# Patient Record
Sex: FEMALE | Race: BLACK OR AFRICAN AMERICAN | NOT HISPANIC OR LATINO | Employment: FULL TIME | URBAN - METROPOLITAN AREA
[De-identification: names, ages, dates, MRNs, and addresses within clinical notes are randomized per-mention and may not be internally consistent; named-entity substitution may affect disease eponyms.]

---

## 2023-05-20 LAB — TRICHOMONAS VAGINALIS: NEGATIVE

## 2023-05-21 LAB
CHLAMYDIA TRACH., AMPLIFIED: NEGATIVE
N. GONORRHEA, AMPLIFIED: NEGATIVE

## 2023-08-08 ENCOUNTER — APPOINTMENT (OUTPATIENT)
Dept: LAB | Facility: LAB | Age: 28
End: 2023-08-08
Payer: MEDICAID

## 2023-08-08 LAB
ABO GROUP (TYPE) IN BLOOD: NORMAL
ANTIBODY SCREEN: NORMAL
CLUE CELLS: PRESENT
ERYTHROCYTE DISTRIBUTION WIDTH (RATIO) BY AUTOMATED COUNT: 14.3 % (ref 11.5–14.5)
ERYTHROCYTE MEAN CORPUSCULAR HEMOGLOBIN CONCENTRATION (G/DL) BY AUTOMATED: 31 G/DL (ref 32–36)
ERYTHROCYTE MEAN CORPUSCULAR VOLUME (FL) BY AUTOMATED COUNT: 95 FL (ref 80–100)
ERYTHROCYTES (10*6/UL) IN BLOOD BY AUTOMATED COUNT: 4.09 X10E12/L (ref 4–5.2)
ESTIMATED AVERAGE GLUCOSE FOR HBA1C: 82 MG/DL
HEMATOCRIT (%) IN BLOOD BY AUTOMATED COUNT: 38.7 % (ref 36–46)
HEMOGLOBIN (G/DL) IN BLOOD: 12 G/DL (ref 12–16)
HEMOGLOBIN A1C/HEMOGLOBIN TOTAL IN BLOOD: 4.5 %
HEPATITIS B VIRUS SURFACE AG PRESENCE IN SERUM: NONREACTIVE
HEPATITIS C VIRUS AB PRESENCE IN SERUM: NONREACTIVE
HIV 1/ 2 AG/AB SCREEN: NONREACTIVE
LEUKOCYTES (10*3/UL) IN BLOOD BY AUTOMATED COUNT: 10.6 X10E9/L (ref 4.4–11.3)
NRBC (PER 100 WBCS) BY AUTOMATED COUNT: 0 /100 WBC (ref 0–0)
NUGENT SCORE: 6
PLATELETS (10*3/UL) IN BLOOD AUTOMATED COUNT: 324 X10E9/L (ref 150–450)
REFLEX ADDED, ANEMIA PANEL: ABNORMAL
RH FACTOR: NORMAL
RUBELLA VIRUS IGG AB: NORMAL
SYPHILIS TOTAL AB: NONREACTIVE
YEAST: ABNORMAL

## 2023-08-09 LAB
CHLAMYDIA TRACH., AMPLIFIED: NEGATIVE
N. GONORRHEA, AMPLIFIED: NEGATIVE
TRICHOMONAS VAGINALIS: NEGATIVE
URINE CULTURE: NORMAL

## 2023-08-10 LAB
HEMOGLOBIN A2: 2.4 %
HEMOGLOBIN A: 97.3 %
HEMOGLOBIN F: 0.3 %
HEMOGLOBIN IDENTIFICATION INTERPRETATION: NORMAL
PATH REVIEW-HGB IDENTIFICATION: NORMAL

## 2023-09-15 ENCOUNTER — LAB (OUTPATIENT)
Dept: LAB | Facility: LAB | Age: 28
End: 2023-09-15
Payer: MEDICAID

## 2023-09-15 LAB
ALANINE AMINOTRANSFERASE (SGPT) (U/L) IN SER/PLAS: 6 U/L (ref 7–45)
ASPARTATE AMINOTRANSFERASE (SGOT) (U/L) IN SER/PLAS: 9 U/L (ref 9–39)
CREATININE (MG/DL) IN SER/PLAS: 0.67 MG/DL (ref 0.5–1.05)
CREATININE (MG/DL) IN URINE: 280 MG/DL (ref 20–320)
ERYTHROCYTE DISTRIBUTION WIDTH (RATIO) BY AUTOMATED COUNT: 13.8 % (ref 11.5–14.5)
ERYTHROCYTE MEAN CORPUSCULAR HEMOGLOBIN CONCENTRATION (G/DL) BY AUTOMATED: 32.4 G/DL (ref 32–36)
ERYTHROCYTE MEAN CORPUSCULAR VOLUME (FL) BY AUTOMATED COUNT: 95 FL (ref 80–100)
ERYTHROCYTES (10*6/UL) IN BLOOD BY AUTOMATED COUNT: 3.79 X10E12/L (ref 4–5.2)
GFR FEMALE: >90 ML/MIN/1.73M2
HEMATOCRIT (%) IN BLOOD BY AUTOMATED COUNT: 36.1 % (ref 36–46)
HEMOGLOBIN (G/DL) IN BLOOD: 11.7 G/DL (ref 12–16)
LACTATE DEHYDROGENASE (U/L) IN SER/PLAS BY LAC->PYR RXN: 109 U/L (ref 84–246)
LEUKOCYTES (10*3/UL) IN BLOOD BY AUTOMATED COUNT: 9.8 X10E9/L (ref 4.4–11.3)
NRBC (PER 100 WBCS) BY AUTOMATED COUNT: 0 /100 WBC (ref 0–0)
PLATELETS (10*3/UL) IN BLOOD AUTOMATED COUNT: 363 X10E9/L (ref 150–450)
PROTEIN (MG/DL) IN URINE: 22 MG/DL (ref 5–24)
PROTEIN/CREATININE (MG/MG) IN URINE: 0.08 MG/MG CREAT (ref 0–0.17)
URATE (MG/DL) IN SER/PLAS: 3.9 MG/DL (ref 2.3–6.7)
UREA NITROGEN (MG/DL) IN SER/PLAS: 6 MG/DL (ref 6–23)

## 2023-10-03 ENCOUNTER — HOSPITAL ENCOUNTER (OUTPATIENT)
Dept: RADIOLOGY | Facility: HOSPITAL | Age: 28
Discharge: HOME | End: 2023-10-03
Payer: MEDICAID

## 2023-10-03 DIAGNOSIS — Z36.82 NUCHAL TRANSLUCENCY OF FETUS ON PRENATAL ULTRASOUND (HHS-HCC): ICD-10-CM

## 2023-10-03 DIAGNOSIS — Z34.90 PREGNANCY (HHS-HCC): ICD-10-CM

## 2023-10-03 PROCEDURE — 76813 OB US NUCHAL MEAS 1 GEST: CPT | Performed by: OBSTETRICS & GYNECOLOGY

## 2023-10-03 PROCEDURE — 76813 OB US NUCHAL MEAS 1 GEST: CPT

## 2023-10-08 PROBLEM — F17.200 NICOTINE USE DISORDER: Status: ACTIVE | Noted: 2018-04-04

## 2023-10-08 PROBLEM — N73.0 PID (ACUTE PELVIC INFLAMMATORY DISEASE): Status: ACTIVE | Noted: 2020-07-19

## 2023-10-08 PROBLEM — R45.851 SUICIDAL IDEATION: Status: ACTIVE | Noted: 2022-12-08

## 2023-10-08 PROBLEM — F31.81 BIPOLAR 2 DISORDER, MAJOR DEPRESSIVE EPISODE (MULTI): Status: ACTIVE | Noted: 2022-10-25

## 2023-10-08 PROBLEM — S83.512A RUPTURE OF ANTERIOR CRUCIATE LIGAMENT OF LEFT KNEE: Status: ACTIVE | Noted: 2022-06-15

## 2023-10-08 PROBLEM — E66.9 OBESITY, CLASS II, BMI 35-39.9: Status: ACTIVE | Noted: 2020-11-27

## 2023-10-08 PROBLEM — D06.9 CIN III WITH SEVERE DYSPLASIA: Status: ACTIVE | Noted: 2023-10-08

## 2023-10-08 PROBLEM — R31.9 HEMATURIA: Status: ACTIVE | Noted: 2018-04-02

## 2023-10-08 PROBLEM — F12.90 MARIJUANA USE: Status: ACTIVE | Noted: 2021-01-26

## 2023-10-08 PROBLEM — O21.0 HYPEREMESIS GRAVIDARUM (HHS-HCC): Status: ACTIVE | Noted: 2021-01-28

## 2023-10-08 PROBLEM — J45.909 ASTHMA (HHS-HCC): Status: ACTIVE | Noted: 2022-06-15

## 2023-10-08 PROBLEM — M54.6 THORACIC SPINE PAIN: Status: ACTIVE | Noted: 2023-10-08

## 2023-10-08 PROBLEM — R87.613 HSIL (HIGH GRADE SQUAMOUS INTRAEPITHELIAL LESION) ON PAP SMEAR OF CERVIX: Status: ACTIVE | Noted: 2023-10-08

## 2023-10-08 PROBLEM — R87.610 ATYPICAL SQUAMOUS CELLS OF UNDETERMINED SIGNIFICANCE (ASCUS) ON PAPANICOLAOU SMEAR OF CERVIX: Status: ACTIVE | Noted: 2023-10-08

## 2023-10-08 RX ORDER — PYRIDOXINE HCL (VITAMIN B6) 25 MG
25 TABLET ORAL EVERY 8 HOURS PRN
COMMUNITY
Start: 2023-08-26

## 2023-10-08 RX ORDER — FLUOXETINE HYDROCHLORIDE 20 MG/1
20 CAPSULE ORAL
Status: ON HOLD | COMMUNITY
Start: 2022-12-09 | End: 2024-04-04 | Stop reason: ALTCHOICE

## 2023-10-08 RX ORDER — ALBUTEROL SULFATE 90 UG/1
1-2 AEROSOL, METERED RESPIRATORY (INHALATION) EVERY 4 HOURS PRN
COMMUNITY
Start: 2022-04-26

## 2023-10-08 RX ORDER — QUETIAPINE FUMARATE 25 MG/1
1-2 TABLET, FILM COATED ORAL NIGHTLY
Status: ON HOLD | COMMUNITY
Start: 2021-10-22 | End: 2024-04-04 | Stop reason: ALTCHOICE

## 2023-10-08 RX ORDER — IBUPROFEN 800 MG/1
800 TABLET ORAL EVERY 8 HOURS PRN
COMMUNITY
Start: 2022-03-02 | End: 2023-10-10 | Stop reason: ALTCHOICE

## 2023-10-08 RX ORDER — UBIDECARENONE 75 MG
1000 CAPSULE ORAL
Status: ON HOLD | COMMUNITY
Start: 2022-12-11 | End: 2024-04-04 | Stop reason: ALTCHOICE

## 2023-10-08 RX ORDER — FERROUS SULFATE 325(65) MG
1 TABLET ORAL 2 TIMES DAILY
Status: ON HOLD | COMMUNITY
Start: 2021-03-03 | End: 2024-04-04 | Stop reason: SDDI

## 2023-10-08 RX ORDER — PRENATAL VIT NO.126/IRON/FOLIC 28MG-0.8MG
1 TABLET ORAL DAILY
COMMUNITY
Start: 2023-09-15

## 2023-10-08 RX ORDER — DIPHENHYDRAMINE HYDROCHLORIDE 25 MG/1
CAPSULE ORAL
COMMUNITY
Start: 2022-10-26 | End: 2023-10-10 | Stop reason: ALTCHOICE

## 2023-10-08 RX ORDER — ACETAMINOPHEN 325 MG/1
3 TABLET ORAL EVERY 6 HOURS PRN
COMMUNITY
Start: 2021-09-10 | End: 2023-10-10 | Stop reason: ALTCHOICE

## 2023-10-08 RX ORDER — DOCUSATE SODIUM 100 MG/1
1 CAPSULE, LIQUID FILLED ORAL 2 TIMES DAILY PRN
Status: ON HOLD | COMMUNITY
Start: 2021-09-10 | End: 2024-04-04 | Stop reason: ALTCHOICE

## 2023-10-08 RX ORDER — HALOPERIDOL 5 MG/1
TABLET ORAL
COMMUNITY
Start: 2022-10-26 | End: 2023-10-10 | Stop reason: ALTCHOICE

## 2023-10-08 RX ORDER — ASPIRIN 81 MG/1
TABLET ORAL
COMMUNITY
Start: 2023-09-15 | End: 2024-04-07 | Stop reason: HOSPADM

## 2023-10-08 RX ORDER — GUANFACINE 1 MG/1
TABLET, EXTENDED RELEASE ORAL
COMMUNITY
Start: 2022-10-26 | End: 2023-10-10 | Stop reason: ALTCHOICE

## 2023-10-10 ENCOUNTER — ROUTINE PRENATAL (OUTPATIENT)
Dept: OBSTETRICS AND GYNECOLOGY | Facility: HOSPITAL | Age: 28
End: 2023-10-10
Payer: MEDICAID

## 2023-10-10 VITALS — WEIGHT: 217 LBS | SYSTOLIC BLOOD PRESSURE: 124 MMHG | BODY MASS INDEX: 37.25 KG/M2 | DIASTOLIC BLOOD PRESSURE: 83 MMHG

## 2023-10-10 DIAGNOSIS — Z3A.14 14 WEEKS GESTATION OF PREGNANCY (HHS-HCC): Primary | ICD-10-CM

## 2023-10-10 DIAGNOSIS — M54.30 SCIATIC NERVE PAIN, UNSPECIFIED LATERALITY: ICD-10-CM

## 2023-10-10 PROCEDURE — 99213 OFFICE O/P EST LOW 20 MIN: CPT | Mod: 25

## 2023-10-10 PROCEDURE — 99213 OFFICE O/P EST LOW 20 MIN: CPT

## 2023-10-10 PROCEDURE — 90686 IIV4 VACC NO PRSV 0.5 ML IM: CPT

## 2023-10-10 ASSESSMENT — ENCOUNTER SYMPTOMS
RESPIRATORY NEGATIVE: 0
CARDIOVASCULAR NEGATIVE: 0
ALLERGIC/IMMUNOLOGIC NEGATIVE: 0
ENDOCRINE NEGATIVE: 0
EYES NEGATIVE: 0
NEUROLOGICAL NEGATIVE: 0
CONSTITUTIONAL NEGATIVE: 0
HEMATOLOGIC/LYMPHATIC NEGATIVE: 0
PSYCHIATRIC NEGATIVE: 0
GASTROINTESTINAL NEGATIVE: 0
MUSCULOSKELETAL NEGATIVE: 0

## 2023-10-10 NOTE — PROGRESS NOTES
Assessment/Plan   Problem List Items Addressed This Visit             ICD-10-CM    Sciatic nerve pain M54.30     Referral to chiropractics placed; patient to schedule         Relevant Orders    Referral to [facility]     Other Visit Diagnoses         Codes    14 weeks gestation of pregnancy    -  Primary Z3A.14    Relevant Medications    doxylamine (Unisom, doxylamine,) 25 mg tablet          Anatomy US scheduled  Reviewed NIPT results WNL  Reviewed warning signs and when to call provider  Follow up in 4 weeks for a routine prenatal visit.    Subjective   Serena Baxter is a 28 y.o.  at 14w0d with a working estimated date of delivery of 2024, by Ultrasound who presents for a routine prenatal visit. She denies vaginal bleeding, abdominal pain, leakage of fluid.     Her pregnancy is complicated by:  Serena Baxter Problems (from 09/15/23 to present)       Problem Noted Resolved    Hyperemesis gravidarum 2021 by Jesusita Hampton No    Priority:  Medium               Objective   Physical Exam  Weight: 98.4 kg (217 lb), Pregravid BMI: Could not be calculated  TWG: Not found.   Expected Total Weight Gain: Could not be calculated   BP: 124/83  Fetal Heart Rate: 150      Postpartum Depression: Not on file        Prenatal Labs  Lab Results   Component Value Date    HGB 11.7 (L) 09/15/2023    HCT 36.1 09/15/2023    HEPBSAG NONREACTIVE 2023     Janet Stockton, SAURABH-YECENIA

## 2023-11-17 ENCOUNTER — HOSPITAL ENCOUNTER (OUTPATIENT)
Dept: RADIOLOGY | Facility: HOSPITAL | Age: 28
Discharge: HOME | End: 2023-11-17
Payer: MEDICAID

## 2023-11-17 ENCOUNTER — ROUTINE PRENATAL (OUTPATIENT)
Dept: OBSTETRICS AND GYNECOLOGY | Facility: HOSPITAL | Age: 28
End: 2023-11-17
Payer: MEDICAID

## 2023-11-17 VITALS — WEIGHT: 216 LBS | BODY MASS INDEX: 37.08 KG/M2 | DIASTOLIC BLOOD PRESSURE: 68 MMHG | SYSTOLIC BLOOD PRESSURE: 102 MMHG

## 2023-11-17 DIAGNOSIS — Z3A.19 19 WEEKS GESTATION OF PREGNANCY (HHS-HCC): Primary | ICD-10-CM

## 2023-11-17 DIAGNOSIS — Z34.90 PREGNANCY (HHS-HCC): ICD-10-CM

## 2023-11-17 DIAGNOSIS — O21.0 HYPEREMESIS GRAVIDARUM (HHS-HCC): ICD-10-CM

## 2023-11-17 PROCEDURE — 76811 OB US DETAILED SNGL FETUS: CPT

## 2023-11-17 PROCEDURE — 99213 OFFICE O/P EST LOW 20 MIN: CPT | Mod: TH | Performed by: NURSE PRACTITIONER

## 2023-11-17 PROCEDURE — 99213 OFFICE O/P EST LOW 20 MIN: CPT | Performed by: NURSE PRACTITIONER

## 2023-11-17 PROCEDURE — 76811 OB US DETAILED SNGL FETUS: CPT | Performed by: STUDENT IN AN ORGANIZED HEALTH CARE EDUCATION/TRAINING PROGRAM

## 2023-11-17 RX ORDER — ONDANSETRON 4 MG/1
4 TABLET, ORALLY DISINTEGRATING ORAL EVERY 8 HOURS PRN
Status: ON HOLD | COMMUNITY
End: 2024-04-04 | Stop reason: ALTCHOICE

## 2023-11-17 NOTE — PROGRESS NOTES
Discussing birth control options with patient encouraged her to start thinking about plans at this point in time.    Denies any current problems with pregnancy.    Was to have ultrasound this morning but was delayed due to her daughters school closing due to power outage.  We will see if patient can have ultrasound some time today.    Return in 4 weeks.

## 2024-01-30 ENCOUNTER — HOSPITAL ENCOUNTER (OUTPATIENT)
Dept: RADIOLOGY | Facility: HOSPITAL | Age: 29
Discharge: HOME | End: 2024-01-30
Payer: MEDICAID

## 2024-01-30 DIAGNOSIS — Z3A.30 30 WEEKS GESTATION OF PREGNANCY (HHS-HCC): ICD-10-CM

## 2024-01-30 DIAGNOSIS — Z3A.30 30 WEEKS GESTATION OF PREGNANCY (HHS-HCC): Primary | ICD-10-CM

## 2024-01-30 DIAGNOSIS — Z3A.19 19 WEEKS GESTATION OF PREGNANCY (HHS-HCC): ICD-10-CM

## 2024-01-30 PROCEDURE — 76819 FETAL BIOPHYS PROFIL W/O NST: CPT | Performed by: OBSTETRICS & GYNECOLOGY

## 2024-01-30 PROCEDURE — 76816 OB US FOLLOW-UP PER FETUS: CPT

## 2024-01-30 PROCEDURE — 76816 OB US FOLLOW-UP PER FETUS: CPT | Performed by: OBSTETRICS & GYNECOLOGY

## 2024-01-30 PROCEDURE — 76819 FETAL BIOPHYS PROFIL W/O NST: CPT

## 2024-02-13 ENCOUNTER — ROUTINE PRENATAL (OUTPATIENT)
Dept: OBSTETRICS AND GYNECOLOGY | Facility: HOSPITAL | Age: 29
End: 2024-02-13
Payer: MEDICAID

## 2024-02-13 ENCOUNTER — LAB (OUTPATIENT)
Dept: LAB | Facility: LAB | Age: 29
End: 2024-02-13
Payer: COMMERCIAL

## 2024-02-13 VITALS — SYSTOLIC BLOOD PRESSURE: 107 MMHG | DIASTOLIC BLOOD PRESSURE: 72 MMHG | BODY MASS INDEX: 37.59 KG/M2 | WEIGHT: 219 LBS

## 2024-02-13 DIAGNOSIS — Z23 NEED FOR TDAP VACCINATION: Primary | ICD-10-CM

## 2024-02-13 DIAGNOSIS — Z3A.32 32 WEEKS GESTATION OF PREGNANCY (HHS-HCC): ICD-10-CM

## 2024-02-13 LAB
ERYTHROCYTE [DISTWIDTH] IN BLOOD BY AUTOMATED COUNT: 13.8 % (ref 11.5–14.5)
GLUCOSE 1H P 50 G GLC PO SERPL-MCNC: 129 MG/DL
HCT VFR BLD AUTO: 31.2 % (ref 36–46)
HGB BLD-MCNC: 9.9 G/DL (ref 12–16)
MCH RBC QN AUTO: 29.1 PG (ref 26–34)
MCHC RBC AUTO-ENTMCNC: 31.7 G/DL (ref 32–36)
MCV RBC AUTO: 92 FL (ref 80–100)
NRBC BLD-RTO: 0 /100 WBCS (ref 0–0)
PLATELET # BLD AUTO: 272 X10*3/UL (ref 150–450)
RBC # BLD AUTO: 3.4 X10*6/UL (ref 4–5.2)
TREPONEMA PALLIDUM IGG+IGM AB [PRESENCE] IN SERUM OR PLASMA BY IMMUNOASSAY: NONREACTIVE
WBC # BLD AUTO: 10.3 X10*3/UL (ref 4.4–11.3)

## 2024-02-13 PROCEDURE — 82607 VITAMIN B-12: CPT

## 2024-02-13 PROCEDURE — 83550 IRON BINDING TEST: CPT

## 2024-02-13 PROCEDURE — 82728 ASSAY OF FERRITIN: CPT

## 2024-02-13 PROCEDURE — 99213 OFFICE O/P EST LOW 20 MIN: CPT | Mod: TH

## 2024-02-13 PROCEDURE — 86780 TREPONEMA PALLIDUM: CPT

## 2024-02-13 PROCEDURE — 82947 ASSAY GLUCOSE BLOOD QUANT: CPT

## 2024-02-13 PROCEDURE — 90471 IMMUNIZATION ADMIN: CPT

## 2024-02-13 PROCEDURE — 82746 ASSAY OF FOLIC ACID SERUM: CPT

## 2024-02-13 PROCEDURE — 99213 OFFICE O/P EST LOW 20 MIN: CPT

## 2024-02-13 PROCEDURE — 36415 COLL VENOUS BLD VENIPUNCTURE: CPT

## 2024-02-13 PROCEDURE — 85027 COMPLETE CBC AUTOMATED: CPT

## 2024-02-13 NOTE — PROGRESS NOTES
Assessment/Plan   Problem List Items Addressed This Visit    None  Visit Diagnoses         Codes    Need for Tdap vaccination    -  Primary Z23    Relevant Orders    Tdap vaccine, age 7 years and older  (BOOSTRIX) (Completed)    32 weeks gestation of pregnancy     Z3A.32    Relevant Orders    Syphilis Screen with Reflex    CBC Anemia Panel With Reflex,Pregnancy    Glucose, 1 Hour Screen, Pregnancy          24-28 week labs completed today  Tdap given  Has growth ultrasound scheduled for 3/15  No other concerns today  Reviewed s/sx of PTL, warning signs, fetal movement counts, and when to call provider  Follow up in 2 weeks for a routine prenatal visit.    Janet Stockton, SAURABH-YECENIA    Subjective     Serena Baxter is a 28 y.o.  at 32w0d with a working estimated date of delivery of 2024, by Ultrasound who presents for a routine prenatal visit. She denies vaginal bleeding, leakage of fluid, decreased fetal movements, or contractions.    Her pregnancy is complicated by:  Serena Baxter Problems (from 09/15/23 to present)       Problem Noted Resolved    Hyperemesis gravidarum 2021 by Jesusita Hampton No    Priority:  Medium               Objective   Physical Exam:   Weight: 99.3 kg (219 lb)  TWG: Not found.  Expected Total Weight Gain: Could not be calculated   Pregravid BMI: Could not be calculated  BP: 107/72  Fetal Heart Rate: 145 Fundal Height (cm): 32 cm Presentation: Vertex           Postpartum Depression: Not on file        Prenatal Labs  Lab Results   Component Value Date    HGB 11.7 (L) 09/15/2023    HCT 36.1 09/15/2023     09/15/2023    ABO A 2023    LABRH POS 2023    NEISSGONOAMP NEGATIVE 2023    CHLAMTRACAMP NEGATIVE 2023    SYPHT NONREACTIVE 2023    HEPBSAG NONREACTIVE 2023    HIV1X2 NONREACTIVE 2023    URINECULTURE MIXED URETHRAL KESHA. 2023

## 2024-02-14 ENCOUNTER — TELEPHONE (OUTPATIENT)
Dept: OBSTETRICS AND GYNECOLOGY | Facility: HOSPITAL | Age: 29
End: 2024-02-14
Payer: COMMERCIAL

## 2024-02-14 NOTE — TELEPHONE ENCOUNTER
Breast order complete signed and faxed to [facility].  PINKY Rivera-RN      ----- Message from EMILY Robin sent at 2/13/2024  4:59 PM EST -----  Regarding: breastpump  This patient is interested in a breast pump.  Thanks!    EMILY Robin

## 2024-02-16 LAB
FERRITIN SERPL-MCNC: 14 NG/ML
FOLATE SERPL-MCNC: 7 NG/ML
IRON SATN MFR SERPL: 11 %
IRON SERPL-MCNC: 52 UG/DL
REFLEX ADDED, ANEMIA PANEL: NORMAL
TIBC SERPL-MCNC: 458 UG/DL
UIBC SERPL-MCNC: 406 UG/DL
VIT B12 SERPL-MCNC: 249 PG/ML

## 2024-02-19 ENCOUNTER — TELEPHONE (OUTPATIENT)
Dept: OBSTETRICS AND GYNECOLOGY | Facility: HOSPITAL | Age: 29
End: 2024-02-19

## 2024-02-19 DIAGNOSIS — O99.013 ANEMIA DURING PREGNANCY IN THIRD TRIMESTER (HHS-HCC): Primary | ICD-10-CM

## 2024-02-19 RX ORDER — FAMOTIDINE 10 MG/ML
20 INJECTION INTRAVENOUS ONCE AS NEEDED
Status: CANCELLED | OUTPATIENT
Start: 2024-02-20

## 2024-02-19 RX ORDER — DIPHENHYDRAMINE HYDROCHLORIDE 50 MG/ML
50 INJECTION INTRAMUSCULAR; INTRAVENOUS AS NEEDED
Status: CANCELLED | OUTPATIENT
Start: 2024-02-20

## 2024-02-19 RX ORDER — EPINEPHRINE 0.3 MG/.3ML
0.3 INJECTION SUBCUTANEOUS EVERY 5 MIN PRN
Status: CANCELLED | OUTPATIENT
Start: 2024-02-20

## 2024-02-19 RX ORDER — ALBUTEROL SULFATE 0.83 MG/ML
3 SOLUTION RESPIRATORY (INHALATION) AS NEEDED
Status: CANCELLED | OUTPATIENT
Start: 2024-02-20

## 2024-02-28 ENCOUNTER — ROUTINE PRENATAL (OUTPATIENT)
Dept: OBSTETRICS AND GYNECOLOGY | Facility: HOSPITAL | Age: 29
End: 2024-02-28
Payer: MEDICAID

## 2024-02-28 VITALS
RESPIRATION RATE: 17 BRPM | HEIGHT: 64 IN | HEART RATE: 102 BPM | SYSTOLIC BLOOD PRESSURE: 127 MMHG | OXYGEN SATURATION: 100 % | TEMPERATURE: 97.3 F | DIASTOLIC BLOOD PRESSURE: 80 MMHG | BODY MASS INDEX: 37.22 KG/M2 | WEIGHT: 218 LBS

## 2024-02-28 DIAGNOSIS — R20.0 NUMBNESS IN BOTH LEGS: ICD-10-CM

## 2024-02-28 DIAGNOSIS — Z3A.34 34 WEEKS GESTATION OF PREGNANCY (HHS-HCC): Primary | ICD-10-CM

## 2024-02-28 PROCEDURE — 99213 OFFICE O/P EST LOW 20 MIN: CPT

## 2024-02-28 PROCEDURE — 99213 OFFICE O/P EST LOW 20 MIN: CPT | Mod: TH

## 2024-02-28 ASSESSMENT — PAIN SCALES - GENERAL: PAINLEVEL_OUTOF10: 0 - NO PAIN

## 2024-02-28 ASSESSMENT — PAIN - FUNCTIONAL ASSESSMENT: PAIN_FUNCTIONAL_ASSESSMENT: 0-10

## 2024-02-28 NOTE — PROGRESS NOTES
"Assessment/Plan   Problem List Items Addressed This Visit    None  Visit Diagnoses         Codes    34 weeks gestation of pregnancy    -  Primary Z3A.34    Relevant Orders    Follow Up In Obstetrics    Numbness in both legs     R20.0    Relevant Orders    Referral to Physical Therapy          Discussed routine GBS screening, to be completed next visit   surveillance weekly for BMI  Has concerns for bilateral leg numbness and hip pain, patient has support belt which does help, but then \"baby doesn't like it\" so she takes it off.  She feels the pain begin in her right sciatic area, then it spreads to her hip and goes down her leg and when she tries to reposition it \"hurts more\".  It happens occasionally on her right side as well.  Patient has tried stretching and it \"doesn't help\".  Offered referral to PT to assist; patient stated \"well, I'm almost due so I'm don't know how much it will help.\"  Counseled patient that a referral will be placed and if she decides to forgo now, she can use the referral for postpartum if desired; patient verbalized understanding and will think about it.  Growth ultrasound scheduled for 3/15.    No other concerns today  Reviewed s/sx of PTL, warning signs, fetal movement counts, and when to call provider  Follow up in 2 weeks for a routine prenatal visit.    EMILY Robin    Subjective     Serena Baxter is a 28 y.o.  at 34w1d with a working estimated date of delivery of 2024, by Ultrasound who presents for a routine prenatal visit. She denies vaginal bleeding, leakage of fluid, decreased fetal movements, or contractions.    Her pregnancy is complicated by:  Serena Baxter Problems (from 09/15/23 to present)       Problem Noted Resolved    Anemia during pregnancy in third trimester 2024 by EMILY Robin No    Priority:  Medium      Hyperemesis gravidarum 2021 by Jesusita Hampton No    Priority:  Medium           "     Objective   Physical Exam:   Weight: 98.9 kg (218 lb)  TWG: -2.268 kg (-5 lb)  Expected Total Weight Gain: 5 kg (11 lb)-9 kg (19 lb)   Pregravid BMI: 38.26  BP: 127/80  Fetal Heart Rate: 140 Fundal Height (cm): 34 cm Presentation: Vertex           Postpartum Depression: Not on file        Prenatal Labs  Lab Results   Component Value Date    HGB 9.9 (L) 02/13/2024    HCT 31.2 (L) 02/13/2024     02/13/2024    ABO A 08/08/2023    LABRH POS 08/08/2023    NEISSGONOAMP NEGATIVE 08/08/2023    CHLAMTRACAMP NEGATIVE 08/08/2023    SYPHT Nonreactive 02/13/2024    HEPBSAG NONREACTIVE 08/08/2023    HIV1X2 NONREACTIVE 08/08/2023    URINECULTURE MIXED URETHRAL KESHA. 08/08/2023     Lab Results   Component Value Date    GLUC1P 129 02/13/2024

## 2024-03-12 DIAGNOSIS — O99.013 ANEMIA DURING PREGNANCY IN THIRD TRIMESTER (HHS-HCC): Primary | ICD-10-CM

## 2024-03-12 RX ORDER — EPINEPHRINE 0.3 MG/.3ML
0.3 INJECTION SUBCUTANEOUS EVERY 5 MIN PRN
OUTPATIENT
Start: 2024-03-12

## 2024-03-12 RX ORDER — DIPHENHYDRAMINE HYDROCHLORIDE 50 MG/ML
50 INJECTION INTRAMUSCULAR; INTRAVENOUS AS NEEDED
OUTPATIENT
Start: 2024-03-12

## 2024-03-12 RX ORDER — ALBUTEROL SULFATE 0.83 MG/ML
3 SOLUTION RESPIRATORY (INHALATION) AS NEEDED
OUTPATIENT
Start: 2024-03-12

## 2024-03-12 RX ORDER — FAMOTIDINE 10 MG/ML
20 INJECTION INTRAVENOUS ONCE AS NEEDED
OUTPATIENT
Start: 2024-03-12

## 2024-03-15 ENCOUNTER — HOSPITAL ENCOUNTER (OUTPATIENT)
Dept: RADIOLOGY | Facility: HOSPITAL | Age: 29
Discharge: HOME | End: 2024-03-15
Payer: MEDICAID

## 2024-03-15 ENCOUNTER — ROUTINE PRENATAL (OUTPATIENT)
Dept: OBSTETRICS AND GYNECOLOGY | Facility: HOSPITAL | Age: 29
End: 2024-03-15
Payer: MEDICAID

## 2024-03-15 ENCOUNTER — INFUSION (OUTPATIENT)
Dept: INFUSION THERAPY | Facility: HOSPITAL | Age: 29
End: 2024-03-15
Payer: MEDICAID

## 2024-03-15 ENCOUNTER — APPOINTMENT (OUTPATIENT)
Dept: OBSTETRICS AND GYNECOLOGY | Facility: HOSPITAL | Age: 29
End: 2024-03-15
Payer: MEDICAID

## 2024-03-15 VITALS
HEART RATE: 73 BPM | RESPIRATION RATE: 16 BRPM | WEIGHT: 229 LBS | BODY MASS INDEX: 39.09 KG/M2 | TEMPERATURE: 98 F | DIASTOLIC BLOOD PRESSURE: 78 MMHG | OXYGEN SATURATION: 100 % | SYSTOLIC BLOOD PRESSURE: 111 MMHG | HEIGHT: 64 IN

## 2024-03-15 VITALS — BODY MASS INDEX: 39.31 KG/M2 | DIASTOLIC BLOOD PRESSURE: 68 MMHG | WEIGHT: 229 LBS | SYSTOLIC BLOOD PRESSURE: 117 MMHG

## 2024-03-15 DIAGNOSIS — Z34.80 SUPERVISION OF OTHER NORMAL PREGNANCY, ANTEPARTUM (HHS-HCC): Primary | ICD-10-CM

## 2024-03-15 DIAGNOSIS — O99.013 ANEMIA DURING PREGNANCY IN THIRD TRIMESTER (HHS-HCC): ICD-10-CM

## 2024-03-15 DIAGNOSIS — Z3A.34 34 WEEKS GESTATION OF PREGNANCY (HHS-HCC): ICD-10-CM

## 2024-03-15 DIAGNOSIS — Z3A.30 30 WEEKS GESTATION OF PREGNANCY (HHS-HCC): ICD-10-CM

## 2024-03-15 PROBLEM — Z34.90 ENCOUNTER FOR SUPERVISION OF NORMAL PREGNANCY, ANTEPARTUM (HHS-HCC): Status: ACTIVE | Noted: 2024-03-15

## 2024-03-15 PROCEDURE — 76816 OB US FOLLOW-UP PER FETUS: CPT | Performed by: OBSTETRICS & GYNECOLOGY

## 2024-03-15 PROCEDURE — 99213 OFFICE O/P EST LOW 20 MIN: CPT | Performed by: OBSTETRICS & GYNECOLOGY

## 2024-03-15 PROCEDURE — 76819 FETAL BIOPHYS PROFIL W/O NST: CPT | Performed by: OBSTETRICS & GYNECOLOGY

## 2024-03-15 PROCEDURE — 87081 CULTURE SCREEN ONLY: CPT | Performed by: OBSTETRICS & GYNECOLOGY

## 2024-03-15 PROCEDURE — 2500000004 HC RX 250 GENERAL PHARMACY W/ HCPCS (ALT 636 FOR OP/ED)

## 2024-03-15 PROCEDURE — 76816 OB US FOLLOW-UP PER FETUS: CPT

## 2024-03-15 PROCEDURE — 96366 THER/PROPH/DIAG IV INF ADDON: CPT

## 2024-03-15 PROCEDURE — 76819 FETAL BIOPHYS PROFIL W/O NST: CPT

## 2024-03-15 PROCEDURE — 96365 THER/PROPH/DIAG IV INF INIT: CPT

## 2024-03-15 PROCEDURE — 99213 OFFICE O/P EST LOW 20 MIN: CPT | Mod: TH | Performed by: OBSTETRICS & GYNECOLOGY

## 2024-03-15 RX ORDER — ALBUTEROL SULFATE 0.83 MG/ML
3 SOLUTION RESPIRATORY (INHALATION) AS NEEDED
OUTPATIENT
Start: 2024-03-15

## 2024-03-15 RX ORDER — FAMOTIDINE 10 MG/ML
20 INJECTION INTRAVENOUS ONCE AS NEEDED
OUTPATIENT
Start: 2024-03-15

## 2024-03-15 RX ORDER — EPINEPHRINE 0.3 MG/.3ML
0.3 INJECTION SUBCUTANEOUS EVERY 5 MIN PRN
OUTPATIENT
Start: 2024-03-15

## 2024-03-15 RX ORDER — DIPHENHYDRAMINE HYDROCHLORIDE 50 MG/ML
50 INJECTION INTRAMUSCULAR; INTRAVENOUS AS NEEDED
OUTPATIENT
Start: 2024-03-15

## 2024-03-15 RX ADMIN — SODIUM CHLORIDE 25 MG: 9 INJECTION, SOLUTION INTRAVENOUS at 10:45

## 2024-03-15 RX ADMIN — SODIUM CHLORIDE 975 MG: 9 INJECTION, SOLUTION INTRAVENOUS at 11:00

## 2024-03-15 ASSESSMENT — PAIN SCALES - GENERAL
PAINLEVEL: 8
PAINLEVEL: 8
PAINLEVEL: 0-NO PAIN
PAINLEVEL: 8
PAINLEVEL: 8
PAINLEVEL: 5
PAINLEVEL: 8

## 2024-03-15 ASSESSMENT — ENCOUNTER SYMPTOMS
DEPRESSION: 0
OCCASIONAL FEELINGS OF UNSTEADINESS: 0

## 2024-03-15 NOTE — PROGRESS NOTES
"[facility] 1200 Infusion Clinic Note   Date: March 15, 2024   Name: Serena Baxter  : 1995   MRN: [id number]         Reason for Visit: iv iron (Patient here for iron infusion)      Accompanied by:Self   Visit Type:: Infusion   Diagnosis: Anemia during pregnancy in third trimester    Allergies:   Allergies as of 03/15/2024 - Reviewed 03/15/2024   Allergen Reaction Noted    Trazodone Hives 10/07/2023    Tramadol Itching 10/07/2023    Latex Itching 10/07/2023      Current Meds has a current medication list which includes the following prescription(s): aspirin, classic prenatal, albuterol, cyanocobalamin, docusate sodium, doxylamine, ferrous sulfate (325 mg ferrous sulfate), fluoxetine, ondansetron odt, quetiapine, and vitamin b-6, and the following Facility-Administered Medications: iron dextran complex (Infed) 975 mg in sodium chloride 0.9% 500 mL IV.        Vitals:  Vitals:    03/15/24 0936   BP: 107/63   BP Location: Left arm   Patient Position: Sitting   BP Cuff Size: Large adult   Pulse: 85   Resp: 16   Temp: 36.8 °C (98.2 °F)   TempSrc: Temporal   SpO2: 100%   Weight: 104 kg (229 lb)   Height: 1.626 m (5' 4\")      Infusion Pre-procedure Checklist   Allergies reviewed: yes   Medications reviewed: yes   Contraindications to treatment:No   Previous reaction to current treatment:No   Current Health Issues: None   Pain: 5 [5]' Abdomen [1]   Contraindications based on patient history: No   Provider notified: Not applicable      Steadi Fall Risk  One or more falls in the last year? No  How many Times?    Was the patient injured in the fall?    Has trouble stepping onto curb?    Advised to use a cane or walker to get around safely?    Often has to rush to toilet?    Feels unsteady when walking?    Has lost some feeling in feet?    Often feels sad or depressed?    Steadies self on furniture while walking at home?    Takes medicine that makes them feel lightheaded or more tired than " usual?    Worried about Falling?    Takes medicine to sleep or improve mood?    Needs to push with hands when rising from a chair?        Negative for complaint: [x] all other systems have been reviewed and are negative for complaint   Infusion Readiness:   Assessment Concerns Related to Infusion: No  Provider notified: n/a  Assess patient for the concerns below. Document provider notification as appropriate:  - Does not meet criteria to treat N/A  - Has an active or recent infection with/without current antibiotic use N/A  - Has recent/planned dental work N/A  - Has recent/planned surgeries N/A  - Has recently received or plans to receive vaccinations N/A  - Has treatment related toxicities N/A  - Is pregnant (unless noted otherwise) YES      We administered iron dextran complex (Infed) 25 mg in sodium chloride 0.9% 50 mL IV and iron dextran complex (Infed) 975 mg in sodium chloride 0.9% 500 mL IV.     [facility] Dr Izabel Jacobson notified.    Patient remained in office for 30 additional minutes after infusion stopped to assess for reaction.   Patient remained stable the entire 30 minutes.   Patient tolerated infusion well, no reactions or complications.   Patient discharged home ambulatory.

## 2024-03-15 NOTE — PROGRESS NOTES
SUBJECTIVE  Serena Baxter is a 28 y.o.  at 36w3d with an estimated date of delivery of 2024, by Ultrasound who presents for a routine prenatal visit.    She denies loss of fluid, vaginal bleeding, regular contractions/cramping, and decreased fetal movement.     OBJECTIVE  Vitals:    03/15/24 1427   BP: 117/68   Weight: 104 kg (229 lb)          ASSESSMENT & PLAN    Anemia during pregnancy in third trimester  - S/p IV iron today    Obesity, Class II, BMI 35-39.9  - Growth US today    Encounter for supervision of normal pregnancy, antepartum  - GBS collected  - Reviewed IOL, considering in 39th week - scheduled at next visit    Follow up in 1 weeks for return OB visit.    Terrie Hair MD  Obstetrics & Gynecology  03/15/24

## 2024-03-18 LAB — GP B STREP GENITAL QL CULT: NORMAL

## 2024-03-19 ENCOUNTER — ROUTINE PRENATAL (OUTPATIENT)
Dept: OBSTETRICS AND GYNECOLOGY | Facility: HOSPITAL | Age: 29
End: 2024-03-19
Payer: COMMERCIAL

## 2024-03-19 VITALS — BODY MASS INDEX: 38.96 KG/M2 | WEIGHT: 227 LBS | DIASTOLIC BLOOD PRESSURE: 82 MMHG | SYSTOLIC BLOOD PRESSURE: 117 MMHG

## 2024-03-19 DIAGNOSIS — T78.40XA ALLERGY, INITIAL ENCOUNTER: ICD-10-CM

## 2024-03-19 DIAGNOSIS — Z3A.37 37 WEEKS GESTATION OF PREGNANCY (HHS-HCC): Primary | ICD-10-CM

## 2024-03-19 PROCEDURE — 99213 OFFICE O/P EST LOW 20 MIN: CPT | Mod: TH

## 2024-03-19 PROCEDURE — 99213 OFFICE O/P EST LOW 20 MIN: CPT

## 2024-03-19 RX ORDER — DIPHENHYDRAMINE HCL 25 MG
25 CAPSULE ORAL NIGHTLY PRN
Qty: 30 CAPSULE | Refills: 0 | Status: SHIPPED | OUTPATIENT
Start: 2024-03-19 | End: 2024-04-07 | Stop reason: HOSPADM

## 2024-03-19 RX ORDER — LORATADINE 10 MG
10 TABLET,DISINTEGRATING ORAL DAILY
Qty: 30 TABLET | Refills: 0 | Status: SHIPPED | OUTPATIENT
Start: 2024-03-19 | End: 2024-04-18

## 2024-03-19 NOTE — PROGRESS NOTES
Assessment/Plan   Problem List Items Addressed This Visit    None  Visit Diagnoses         Codes    37 weeks gestation of pregnancy    -  Primary Z3A.37    Relevant Orders    Labor Induction    Follow Up In Obstetrics    Follow Up In Obstetrics    Allergy, initial encounter     T78.40XA    Relevant Medications    diphenhydrAMINE (BENADryl) 25 mg capsule    loratadine (Claritin Reditabs) 10 mg disintegrating tablet           surveillance weekly for BMI  Discussed expectations and methods used for IOL  IOL scheduled 2024  No other concerns  Reviewed s/sx of labor, warning signs, fetal movement counts, and when to call provider  Follow up in 1 week for a routine prenatal visit.    EMILY Robin    Subjective     Serena Baxter is a 28 y.o.  at 37w0d with a working estimated date of delivery of 2024, by Ultrasound who presents for a routine prenatal visit. She denies vaginal bleeding, leakage of fluid, decreased fetal movements, or contractions.    Her pregnancy is complicated by:  Serena Baxter Problems (from 09/15/23 to present)       Problem Noted Resolved    Anemia during pregnancy in third trimester 2024 by EMILY Robin No    Priority:  Medium      Hyperemesis gravidarum 2021 by Jesusita Hampton No    Priority:  Medium      Encounter for supervision of normal pregnancy, antepartum 3/15/2024 by Terrie Hair MD No             Objective   Physical Exam:   Weight: 103 kg (227 lb)  TW.814 kg (4 lb)  Expected Total Weight Gain: 5 kg (11 lb)-9 kg (19 lb)   Pregravid BMI: 38.26  BP: 117/82  Fetal Heart Rate: 140 Fundal Height (cm): 38 cm Presentation: Vertex           Postpartum Depression: Not on file        Prenatal Labs  Lab Results   Component Value Date    HGB 9.9 (L) 2024    HCT 31.2 (L) 2024     2024    ABO A 2023    LABRH POS 2023    NEISSGONOAMP NEGATIVE 2023    CHLAMTRACAMP NEGATIVE  08/08/2023    SYPHT Nonreactive 02/13/2024    HEPBSAG NONREACTIVE 08/08/2023    HIV1X2 NONREACTIVE 08/08/2023    URINECULTURE MIXED URETHRAL KESHA. 08/08/2023     Lab Results   Component Value Date    GLUC1P 129 02/13/2024     Group B Strep Screen   Date Value Ref Range Status   03/15/2024 No Group B Streptococcus (GBS) isolated  Final

## 2024-04-04 ENCOUNTER — HOSPITAL ENCOUNTER (INPATIENT)
Facility: HOSPITAL | Age: 29
LOS: 3 days | Discharge: HOME | End: 2024-04-07
Attending: OBSTETRICS & GYNECOLOGY | Admitting: MIDWIFE
Payer: COMMERCIAL

## 2024-04-04 ENCOUNTER — APPOINTMENT (OUTPATIENT)
Dept: OBSTETRICS AND GYNECOLOGY | Facility: HOSPITAL | Age: 29
End: 2024-04-04
Payer: COMMERCIAL

## 2024-04-04 ENCOUNTER — ANESTHESIA (OUTPATIENT)
Dept: OBSTETRICS AND GYNECOLOGY | Facility: HOSPITAL | Age: 29
End: 2024-04-04
Payer: COMMERCIAL

## 2024-04-04 ENCOUNTER — ANESTHESIA EVENT (OUTPATIENT)
Dept: OBSTETRICS AND GYNECOLOGY | Facility: HOSPITAL | Age: 29
End: 2024-04-04
Payer: COMMERCIAL

## 2024-04-04 DIAGNOSIS — Z3A.37 37 WEEKS GESTATION OF PREGNANCY (HHS-HCC): ICD-10-CM

## 2024-04-04 DIAGNOSIS — Z30.017 NEXPLANON INSERTION: ICD-10-CM

## 2024-04-04 PROBLEM — Z85.89 HISTORY OF MUSCULOSKELETAL CANCER: Status: ACTIVE | Noted: 2024-04-04

## 2024-04-04 PROBLEM — F05 POSTOPERATIVE DELIRIUM: Status: ACTIVE | Noted: 2024-04-04

## 2024-04-04 PROBLEM — K92.2 GI BLEED: Status: ACTIVE | Noted: 2024-04-04

## 2024-04-04 PROBLEM — K44.9 ESOPHAGEAL HERNIA: Status: ACTIVE | Noted: 2024-04-04

## 2024-04-04 PROBLEM — Z34.90 ENCOUNTER FOR INDUCTION OF LABOR (HHS-HCC): Status: ACTIVE | Noted: 2024-04-04

## 2024-04-04 PROBLEM — G70.9 NEUROMUSCULAR DISEASE (MULTI): Status: ACTIVE | Noted: 2024-04-04

## 2024-04-04 PROBLEM — F41.9 ANXIETY: Status: ACTIVE | Noted: 2024-04-04

## 2024-04-04 LAB
ABO GROUP (TYPE) IN BLOOD: NORMAL
ANTIBODY SCREEN: NORMAL
ERYTHROCYTE [DISTWIDTH] IN BLOOD BY AUTOMATED COUNT: 15.6 % (ref 11.5–14.5)
HCT VFR BLD AUTO: 34.9 % (ref 36–46)
HGB BLD-MCNC: 11.3 G/DL (ref 12–16)
MCH RBC QN AUTO: 29.3 PG (ref 26–34)
MCHC RBC AUTO-ENTMCNC: 32.4 G/DL (ref 32–36)
MCV RBC AUTO: 90 FL (ref 80–100)
NRBC BLD-RTO: 0 /100 WBCS (ref 0–0)
PLATELET # BLD AUTO: 271 X10*3/UL (ref 150–450)
RBC # BLD AUTO: 3.86 X10*6/UL (ref 4–5.2)
RH FACTOR (ANTIGEN D): NORMAL
TREPONEMA PALLIDUM IGG+IGM AB [PRESENCE] IN SERUM OR PLASMA BY IMMUNOASSAY: NONREACTIVE
WBC # BLD AUTO: 9.2 X10*3/UL (ref 4.4–11.3)

## 2024-04-04 PROCEDURE — 3E033VJ INTRODUCTION OF OTHER HORMONE INTO PERIPHERAL VEIN, PERCUTANEOUS APPROACH: ICD-10-PCS

## 2024-04-04 PROCEDURE — 36415 COLL VENOUS BLD VENIPUNCTURE: CPT | Performed by: MIDWIFE

## 2024-04-04 PROCEDURE — 86901 BLOOD TYPING SEROLOGIC RH(D): CPT | Performed by: MIDWIFE

## 2024-04-04 PROCEDURE — 85027 COMPLETE CBC AUTOMATED: CPT | Performed by: MIDWIFE

## 2024-04-04 PROCEDURE — 86780 TREPONEMA PALLIDUM: CPT | Performed by: MIDWIFE

## 2024-04-04 PROCEDURE — 2500000004 HC RX 250 GENERAL PHARMACY W/ HCPCS (ALT 636 FOR OP/ED): Performed by: MIDWIFE

## 2024-04-04 PROCEDURE — 1120000001 HC OB PRIVATE ROOM DAILY

## 2024-04-04 RX ORDER — TRANEXAMIC ACID 100 MG/ML
1000 INJECTION, SOLUTION INTRAVENOUS ONCE AS NEEDED
Status: DISCONTINUED | OUTPATIENT
Start: 2024-04-04 | End: 2024-04-05 | Stop reason: HOSPADM

## 2024-04-04 RX ORDER — OXYTOCIN/0.9 % SODIUM CHLORIDE 30/500 ML
60 PLASTIC BAG, INJECTION (ML) INTRAVENOUS
Status: DISCONTINUED | OUTPATIENT
Start: 2024-04-04 | End: 2024-04-05 | Stop reason: HOSPADM

## 2024-04-04 RX ORDER — OXYTOCIN/0.9 % SODIUM CHLORIDE 30/500 ML
2-30 PLASTIC BAG, INJECTION (ML) INTRAVENOUS CONTINUOUS
Status: DISCONTINUED | OUTPATIENT
Start: 2024-04-04 | End: 2024-04-05

## 2024-04-04 RX ORDER — SODIUM CHLORIDE, SODIUM LACTATE, POTASSIUM CHLORIDE, CALCIUM CHLORIDE 600; 310; 30; 20 MG/100ML; MG/100ML; MG/100ML; MG/100ML
125 INJECTION, SOLUTION INTRAVENOUS CONTINUOUS
Status: DISCONTINUED | OUTPATIENT
Start: 2024-04-04 | End: 2024-04-05

## 2024-04-04 RX ORDER — OXYTOCIN/0.9 % SODIUM CHLORIDE 30/500 ML
2-30 PLASTIC BAG, INJECTION (ML) INTRAVENOUS CONTINUOUS
Status: DISCONTINUED | OUTPATIENT
Start: 2024-04-04 | End: 2024-04-04 | Stop reason: SDUPTHER

## 2024-04-04 RX ORDER — LOPERAMIDE HYDROCHLORIDE 2 MG/1
4 CAPSULE ORAL EVERY 2 HOUR PRN
Status: DISCONTINUED | OUTPATIENT
Start: 2024-04-04 | End: 2024-04-05 | Stop reason: HOSPADM

## 2024-04-04 RX ORDER — TERBUTALINE SULFATE 1 MG/ML
0.25 INJECTION SUBCUTANEOUS ONCE AS NEEDED
Status: DISCONTINUED | OUTPATIENT
Start: 2024-04-04 | End: 2024-04-05 | Stop reason: HOSPADM

## 2024-04-04 RX ORDER — MISOPROSTOL 200 UG/1
800 TABLET ORAL ONCE AS NEEDED
Status: DISCONTINUED | OUTPATIENT
Start: 2024-04-04 | End: 2024-04-05 | Stop reason: HOSPADM

## 2024-04-04 RX ORDER — CARBOPROST TROMETHAMINE 250 UG/ML
250 INJECTION, SOLUTION INTRAMUSCULAR ONCE AS NEEDED
Status: DISCONTINUED | OUTPATIENT
Start: 2024-04-04 | End: 2024-04-05 | Stop reason: HOSPADM

## 2024-04-04 RX ORDER — METHYLERGONOVINE MALEATE 0.2 MG/ML
0.2 INJECTION INTRAVENOUS ONCE AS NEEDED
Status: DISCONTINUED | OUTPATIENT
Start: 2024-04-04 | End: 2024-04-05 | Stop reason: HOSPADM

## 2024-04-04 RX ORDER — OXYTOCIN 10 [USP'U]/ML
10 INJECTION, SOLUTION INTRAMUSCULAR; INTRAVENOUS ONCE AS NEEDED
Status: DISCONTINUED | OUTPATIENT
Start: 2024-04-04 | End: 2024-04-05 | Stop reason: HOSPADM

## 2024-04-04 RX ADMIN — Medication 2 MILLI-UNITS/MIN: at 20:22

## 2024-04-04 RX ADMIN — SODIUM CHLORIDE, POTASSIUM CHLORIDE, SODIUM LACTATE AND CALCIUM CHLORIDE 125 ML/HR: 600; 310; 30; 20 INJECTION, SOLUTION INTRAVENOUS at 20:17

## 2024-04-04 SDOH — HEALTH STABILITY: MENTAL HEALTH: HAVE YOU USED ANY PRESCRIPTION DRUGS OTHER THAN PRESCRIBED IN THE PAST 12 MONTHS?: NO

## 2024-04-04 SDOH — HEALTH STABILITY: MENTAL HEALTH: SUICIDAL BEHAVIOR (LIFETIME): NO

## 2024-04-04 SDOH — SOCIAL STABILITY: SOCIAL INSECURITY: ARE YOU OR HAVE YOU BEEN THREATENED OR ABUSED PHYSICALLY, EMOTIONALLY, OR SEXUALLY BY ANYONE?: NO

## 2024-04-04 SDOH — HEALTH STABILITY: MENTAL HEALTH: NON-SPECIFIC ACTIVE SUICIDAL THOUGHTS (PAST 1 MONTH): NO

## 2024-04-04 SDOH — HEALTH STABILITY: MENTAL HEALTH: WERE YOU ABLE TO COMPLETE ALL THE BEHAVIORAL HEALTH SCREENINGS?: YES

## 2024-04-04 SDOH — ECONOMIC STABILITY: HOUSING INSECURITY: DO YOU FEEL UNSAFE GOING BACK TO THE PLACE WHERE YOU ARE LIVING?: NO

## 2024-04-04 SDOH — SOCIAL STABILITY: SOCIAL INSECURITY: VERBAL ABUSE: DENIES

## 2024-04-04 SDOH — HEALTH STABILITY: MENTAL HEALTH: CURRENT SMOKER: 1

## 2024-04-04 SDOH — SOCIAL STABILITY: SOCIAL INSECURITY: PHYSICAL ABUSE: DENIES

## 2024-04-04 SDOH — SOCIAL STABILITY: SOCIAL INSECURITY: DOES ANYONE TRY TO KEEP YOU FROM HAVING/CONTACTING OTHER FRIENDS OR DOING THINGS OUTSIDE YOUR HOME?: NO

## 2024-04-04 SDOH — SOCIAL STABILITY: SOCIAL INSECURITY: ABUSE SCREEN: ADULT

## 2024-04-04 SDOH — HEALTH STABILITY: MENTAL HEALTH: HAVE YOU USED ANY SUBSTANCES (CANABIS, COCAINE, HEROIN, HALLUCINOGENS, INHALANTS, ETC.) IN THE PAST 12 MONTHS?: NO

## 2024-04-04 SDOH — SOCIAL STABILITY: SOCIAL INSECURITY: DO YOU FEEL ANYONE HAS EXPLOITED OR TAKEN ADVANTAGE OF YOU FINANCIALLY OR OF YOUR PERSONAL PROPERTY?: NO

## 2024-04-04 SDOH — SOCIAL STABILITY: SOCIAL INSECURITY: HAVE YOU HAD THOUGHTS OF HARMING ANYONE ELSE?: NO

## 2024-04-04 SDOH — HEALTH STABILITY: MENTAL HEALTH: WISH TO BE DEAD (PAST 1 MONTH): NO

## 2024-04-04 SDOH — SOCIAL STABILITY: SOCIAL INSECURITY: ARE THERE ANY APPARENT SIGNS OF INJURIES/BEHAVIORS THAT COULD BE RELATED TO ABUSE/NEGLECT?: NO

## 2024-04-04 SDOH — SOCIAL STABILITY: SOCIAL INSECURITY: HAS ANYONE EVER THREATENED TO HURT YOUR FAMILY OR YOUR PETS?: NO

## 2024-04-04 ASSESSMENT — PAIN SCALES - GENERAL
PAINLEVEL_OUTOF10: 5 - MODERATE PAIN
PAINLEVEL_OUTOF10: 0 - NO PAIN
PAINLEVEL_OUTOF10: 0 - NO PAIN
PAINLEVEL_OUTOF10: 5 - MODERATE PAIN

## 2024-04-04 ASSESSMENT — PATIENT HEALTH QUESTIONNAIRE - PHQ9
1. LITTLE INTEREST OR PLEASURE IN DOING THINGS: NOT AT ALL
2. FEELING DOWN, DEPRESSED OR HOPELESS: NOT AT ALL
SUM OF ALL RESPONSES TO PHQ9 QUESTIONS 1 & 2: 0

## 2024-04-04 ASSESSMENT — LIFESTYLE VARIABLES
SKIP TO QUESTIONS 9-10: 1
AUDIT-C TOTAL SCORE: 0
HOW OFTEN DO YOU HAVE A DRINK CONTAINING ALCOHOL: NEVER
HOW MANY STANDARD DRINKS CONTAINING ALCOHOL DO YOU HAVE ON A TYPICAL DAY: PATIENT DOES NOT DRINK
AUDIT-C TOTAL SCORE: 0
HOW OFTEN DO YOU HAVE 6 OR MORE DRINKS ON ONE OCCASION: NEVER

## 2024-04-04 ASSESSMENT — ACTIVITIES OF DAILY LIVING (ADL): LACK_OF_TRANSPORTATION: NO

## 2024-04-04 NOTE — H&P
Obstetrical Admission History and Physical     Serena Baxter is a 28 y.o.  at 39w2d. FRANCISCA: 2024, by Ultrasound. Estimated fetal weight: 7#8. She has had PNC with Kath.    Chief Complaint: IOL  Pregnancy notable for:  -GBS neg  -EFW 7#8    PMH: asthma  PSH: neg  GYN hx:  Hsil CINIII  OBHx: svb x4- one set of twins  Fam hx: noncontributory  Meds: asa pnv  Social hx: denies t/e/d      Assessment/Plan    28 y/p  at 39.2 weeks  Favorable cervix at term  Cat 1  Gbs neg      P: Admit to labor and delivery       OB admission labs      Monitor vital signs per unit protocol      Encourage frequent position changes      Regular diet until pitocin or epidural      Pain management per patient request      Continue assessment of maternal and fetal well-being      Recheck as clinically indicted by maternal or fetal status      Anticipate SVB       __Shlomo___ aware of admission    EMILY Edward   Principal Problem:    Encounter for induction of labor      Serena Baxter Problems (from 09/15/23 to present)       Problem Noted Resolved    Encounter for induction of labor 2024 by EMILY Wong No    Priority:  Medium      Encounter for supervision of normal pregnancy, antepartum 3/15/2024 by Terrie Hair MD No    Priority:  Medium      Anemia during pregnancy in third trimester 2024 by EMILY Robin No    Priority:  Medium      Hyperemesis gravidarum 2021 by Jesusita Hampton No    Priority:  Medium            Options for delivery have been discussed with the patient and she elects for an induction of labor.  Cervical ripening with cytotec, cervidil, other prostaglandin agents has been discussed.  Induction of labor with pitocin, amniotomy, cytotec, and cervical balloon have been discussed in detail. The risks, benefits, complications, alternatives, expected outcomes, potential problems during recuperation and recovery, and the risks of not performing the  procedure were discussed with the patient. The patient stated understanding that the risks of delivery include, but are not limited to: death; reaction to medications; injury to bowel, bladder, ureters, uterus, cervix, vagina, and other pelvic and abdominal structures, infection; blood loss and possible need for transfusion; and potential need for surgery, including hysterectomy. The risks of injury to the infant during delivery were also discussed. All questions were answered. There was concurrence with the planned procedure, and the patient wanted to proceed.    Admit to inpatient status. I anticipate that this patient will require a stay exceeding at least 2 midnights for delivery and postpartum.  Induction of labor.  Management of pregnancy complications, as indicated.    Subjective   Good fetal movement. Denies vaginal bleeding., Denies contractions., Denies leaking of fluid.       Reason for Induction of Labor:  Pregnancy at 39 weeks or greater for induction         Obstetrical History   OB History    Para Term  AB Living   5 4 3 1 0 5   SAB IAB Ectopic Multiple Live Births   0 0 0 1 5      # Outcome Date GA Lbr Kit/2nd Weight Sex Delivery Anes PTL Lv   5 Current            4 Term 21 39w4d  2.92 kg F Vag-Spont EPI N HILL      Complications: Positive GBS test   3 Term 16 37w0d  3.005 kg F Vag-Spont EPI  HILL   2A  14 36w0d  2.268 kg F Vag-Spont EPI Y HILL   2B  14 36w0d  2.268 kg F Vag-Spont EPI  HILL   1 Term 13 38w0d  2.892 kg F Vag-Spont EPI N HILL       Past Medical History  Past Medical History:   Diagnosis Date    Asthma     Encounter for immunization     Need for Tdap vaccination    Family history of sickle cell trait     Hx of pre-eclampsia in prior pregnancy, currently pregnant     Post partum depression         Past Surgical History   Past Surgical History:   Procedure Laterality Date    OTHER SURGICAL HISTORY  2022    Loop electrosurgical  excision procedure       Social History  Social History     Tobacco Use    Smoking status: Never    Smokeless tobacco: Never   Substance Use Topics    Alcohol use: Not Currently     Substance and Sexual Activity   Drug Use Never       Allergies  Trazodone, Tramadol, and Latex     Medications  Medications Prior to Admission   Medication Sig Dispense Refill Last Dose    albuterol 90 mcg/actuation inhaler Inhale 1-2 puffs every 4 hours if needed for wheezing.   More than a month    aspirin 81 mg EC tablet    Past Week    Classic Prenatal 28 mg iron- 800 mcg tablet tablet Take 1 tablet by mouth once daily.   4/4/2024    diphenhydrAMINE (BENADryl) 25 mg capsule Take 1 capsule (25 mg) by mouth as needed at bedtime for itching. 30 capsule 0 Past Month    doxylamine (Unisom, doxylamine,) 25 mg tablet Take 1 tablet (25 mg) by mouth as needed at bedtime for sleep. 30 tablet 0     loratadine (Claritin Reditabs) 10 mg disintegrating tablet Take 1 tablet (10 mg) by mouth once daily. 30 tablet 0 Past Month    Vitamin B-6 25 mg tablet Take 1 tablet (25 mg) by mouth every 8 hours if needed.   Past Month       Objective    Last Vitals  Temp Pulse Resp BP MAP O2 Sat   36.7 °C (98.1 °F) 102 18 116/71   98 %     Physical Examination  HEENT: PERRLA. External ears normal. Nose normal, no erythema or discharge. Mouth and throat clear.  ABDOMEN: soft, gravid, nontender, nondistended, no abnormal masses, no epigastric pain  FHR is  , with  , and a   tracing.    [place] reading:    The fetus is in a vertex presentation, determined by ultrasound  VAGINA: normal appearing vagina with normal color and discharge and no lesions noted  CERVIX: 3 cm dilated, 80 % effaced, -3 station; MEMBRANES are  Intact  EXTREMITIES: no redness or tenderness in the calves or thighs, no edema  SKIN: normal coloration and turgor, no rashes  PSYCHOLOGICAL: awake and alert; oriented to person, place, and time    Lab Review  Labs in chart were reviewed.

## 2024-04-05 PROCEDURE — 51701 INSERT BLADDER CATHETER: CPT

## 2024-04-05 PROCEDURE — 2500000005 HC RX 250 GENERAL PHARMACY W/O HCPCS

## 2024-04-05 PROCEDURE — 01967 NEURAXL LBR ANES VAG DLVR: CPT | Performed by: ANESTHESIOLOGY

## 2024-04-05 PROCEDURE — 7100000016 HC LABOR RECOVERY PER HOUR

## 2024-04-05 PROCEDURE — 7210000002 HC LABOR PER HOUR

## 2024-04-05 PROCEDURE — 1210000001 HC SEMI-PRIVATE ROOM DAILY

## 2024-04-05 PROCEDURE — 2500000001 HC RX 250 WO HCPCS SELF ADMINISTERED DRUGS (ALT 637 FOR MEDICARE OP): Performed by: MIDWIFE

## 2024-04-05 PROCEDURE — 01967 NEURAXL LBR ANES VAG DLVR: CPT

## 2024-04-05 PROCEDURE — 10907ZC DRAINAGE OF AMNIOTIC FLUID, THERAPEUTIC FROM PRODUCTS OF CONCEPTION, VIA NATURAL OR ARTIFICIAL OPENING: ICD-10-PCS | Performed by: MIDWIFE

## 2024-04-05 PROCEDURE — 2500000004 HC RX 250 GENERAL PHARMACY W/ HCPCS (ALT 636 FOR OP/ED): Performed by: MIDWIFE

## 2024-04-05 PROCEDURE — 59409 OBSTETRICAL CARE: CPT | Performed by: MIDWIFE

## 2024-04-05 RX ORDER — ONDANSETRON HYDROCHLORIDE 2 MG/ML
8 INJECTION, SOLUTION INTRAVENOUS ONCE
Status: COMPLETED | OUTPATIENT
Start: 2024-04-05 | End: 2024-04-05

## 2024-04-05 RX ORDER — ONDANSETRON HYDROCHLORIDE 2 MG/ML
4 INJECTION, SOLUTION INTRAVENOUS EVERY 6 HOURS PRN
Status: DISCONTINUED | OUTPATIENT
Start: 2024-04-05 | End: 2024-04-07 | Stop reason: HOSPADM

## 2024-04-05 RX ORDER — ADHESIVE BANDAGE
10 BANDAGE TOPICAL
Status: DISCONTINUED | OUTPATIENT
Start: 2024-04-05 | End: 2024-04-07 | Stop reason: HOSPADM

## 2024-04-05 RX ORDER — ENOXAPARIN SODIUM 100 MG/ML
40 INJECTION SUBCUTANEOUS EVERY 24 HOURS
Status: DISCONTINUED | OUTPATIENT
Start: 2024-04-06 | End: 2024-04-07 | Stop reason: HOSPADM

## 2024-04-05 RX ORDER — DIPHENHYDRAMINE HCL 25 MG
25 CAPSULE ORAL EVERY 6 HOURS PRN
Status: DISCONTINUED | OUTPATIENT
Start: 2024-04-05 | End: 2024-04-07 | Stop reason: HOSPADM

## 2024-04-05 RX ORDER — ONDANSETRON 4 MG/1
4 TABLET, FILM COATED ORAL EVERY 6 HOURS PRN
Status: DISCONTINUED | OUTPATIENT
Start: 2024-04-05 | End: 2024-04-05 | Stop reason: SDUPTHER

## 2024-04-05 RX ORDER — OXYTOCIN 10 [USP'U]/ML
10 INJECTION, SOLUTION INTRAMUSCULAR; INTRAVENOUS ONCE AS NEEDED
Status: DISCONTINUED | OUTPATIENT
Start: 2024-04-05 | End: 2024-04-07 | Stop reason: HOSPADM

## 2024-04-05 RX ORDER — LOPERAMIDE HYDROCHLORIDE 2 MG/1
4 CAPSULE ORAL EVERY 2 HOUR PRN
Status: DISCONTINUED | OUTPATIENT
Start: 2024-04-05 | End: 2024-04-07 | Stop reason: HOSPADM

## 2024-04-05 RX ORDER — METOCLOPRAMIDE 10 MG/1
10 TABLET ORAL EVERY 6 HOURS PRN
Status: DISCONTINUED | OUTPATIENT
Start: 2024-04-05 | End: 2024-04-07 | Stop reason: HOSPADM

## 2024-04-05 RX ORDER — ONDANSETRON 4 MG/1
4 TABLET, FILM COATED ORAL EVERY 6 HOURS PRN
Status: DISCONTINUED | OUTPATIENT
Start: 2024-04-05 | End: 2024-04-07 | Stop reason: HOSPADM

## 2024-04-05 RX ORDER — METHYLERGONOVINE MALEATE 0.2 MG/ML
0.2 INJECTION INTRAVENOUS ONCE AS NEEDED
Status: DISCONTINUED | OUTPATIENT
Start: 2024-04-05 | End: 2024-04-07 | Stop reason: HOSPADM

## 2024-04-05 RX ORDER — BISACODYL 10 MG/1
10 SUPPOSITORY RECTAL DAILY PRN
Status: DISCONTINUED | OUTPATIENT
Start: 2024-04-05 | End: 2024-04-07 | Stop reason: HOSPADM

## 2024-04-05 RX ORDER — FENTANYL/BUPIVACAINE/NS/PF 2MCG/ML-.1
PLASTIC BAG, INJECTION (ML) INJECTION CONTINUOUS PRN
Status: DISCONTINUED | OUTPATIENT
Start: 2024-04-05 | End: 2024-04-05

## 2024-04-05 RX ORDER — LIDOCAINE 560 MG/1
1 PATCH PERCUTANEOUS; TOPICAL; TRANSDERMAL
Status: DISCONTINUED | OUTPATIENT
Start: 2024-04-05 | End: 2024-04-07 | Stop reason: HOSPADM

## 2024-04-05 RX ORDER — FENTANYL/BUPIVACAINE/NS/PF 2MCG/ML-.1
PLASTIC BAG, INJECTION (ML) INJECTION AS NEEDED
Status: DISCONTINUED | OUTPATIENT
Start: 2024-04-05 | End: 2024-04-05

## 2024-04-05 RX ORDER — TRANEXAMIC ACID 100 MG/ML
1000 INJECTION, SOLUTION INTRAVENOUS ONCE AS NEEDED
Status: DISCONTINUED | OUTPATIENT
Start: 2024-04-05 | End: 2024-04-07 | Stop reason: HOSPADM

## 2024-04-05 RX ORDER — METOCLOPRAMIDE HYDROCHLORIDE 5 MG/ML
10 INJECTION INTRAMUSCULAR; INTRAVENOUS EVERY 6 HOURS PRN
Status: DISCONTINUED | OUTPATIENT
Start: 2024-04-05 | End: 2024-04-07 | Stop reason: HOSPADM

## 2024-04-05 RX ORDER — HYDRALAZINE HYDROCHLORIDE 20 MG/ML
5 INJECTION INTRAMUSCULAR; INTRAVENOUS ONCE AS NEEDED
Status: DISCONTINUED | OUTPATIENT
Start: 2024-04-05 | End: 2024-04-07 | Stop reason: HOSPADM

## 2024-04-05 RX ORDER — CARBOPROST TROMETHAMINE 250 UG/ML
250 INJECTION, SOLUTION INTRAMUSCULAR ONCE AS NEEDED
Status: DISCONTINUED | OUTPATIENT
Start: 2024-04-05 | End: 2024-04-07 | Stop reason: HOSPADM

## 2024-04-05 RX ORDER — IBUPROFEN 600 MG/1
600 TABLET ORAL EVERY 6 HOURS
Status: DISCONTINUED | OUTPATIENT
Start: 2024-04-05 | End: 2024-04-07 | Stop reason: HOSPADM

## 2024-04-05 RX ORDER — OXYTOCIN/0.9 % SODIUM CHLORIDE 30/500 ML
60 PLASTIC BAG, INJECTION (ML) INTRAVENOUS ONCE AS NEEDED
Status: DISCONTINUED | OUTPATIENT
Start: 2024-04-05 | End: 2024-04-07 | Stop reason: HOSPADM

## 2024-04-05 RX ORDER — NIFEDIPINE 10 MG/1
10 CAPSULE ORAL ONCE AS NEEDED
Status: DISCONTINUED | OUTPATIENT
Start: 2024-04-05 | End: 2024-04-07 | Stop reason: HOSPADM

## 2024-04-05 RX ORDER — DIPHENHYDRAMINE HYDROCHLORIDE 50 MG/ML
25 INJECTION INTRAMUSCULAR; INTRAVENOUS EVERY 6 HOURS PRN
Status: DISCONTINUED | OUTPATIENT
Start: 2024-04-05 | End: 2024-04-07 | Stop reason: HOSPADM

## 2024-04-05 RX ORDER — SIMETHICONE 80 MG
80 TABLET,CHEWABLE ORAL 4 TIMES DAILY PRN
Status: DISCONTINUED | OUTPATIENT
Start: 2024-04-05 | End: 2024-04-07 | Stop reason: HOSPADM

## 2024-04-05 RX ORDER — ONDANSETRON HYDROCHLORIDE 2 MG/ML
4 INJECTION, SOLUTION INTRAVENOUS EVERY 6 HOURS PRN
Status: DISCONTINUED | OUTPATIENT
Start: 2024-04-05 | End: 2024-04-05 | Stop reason: SDUPTHER

## 2024-04-05 RX ORDER — POLYETHYLENE GLYCOL 3350 17 G/17G
17 POWDER, FOR SOLUTION ORAL 2 TIMES DAILY PRN
Status: DISCONTINUED | OUTPATIENT
Start: 2024-04-05 | End: 2024-04-07 | Stop reason: HOSPADM

## 2024-04-05 RX ORDER — LABETALOL HYDROCHLORIDE 5 MG/ML
20 INJECTION, SOLUTION INTRAVENOUS ONCE AS NEEDED
Status: DISCONTINUED | OUTPATIENT
Start: 2024-04-05 | End: 2024-04-07 | Stop reason: HOSPADM

## 2024-04-05 RX ORDER — LIDOCAINE HYDROCHLORIDE 10 MG/ML
30 INJECTION INFILTRATION; PERINEURAL ONCE AS NEEDED
Status: DISCONTINUED | OUTPATIENT
Start: 2024-04-05 | End: 2024-04-07 | Stop reason: HOSPADM

## 2024-04-05 RX ORDER — ACETAMINOPHEN 325 MG/1
975 TABLET ORAL EVERY 6 HOURS
Status: DISCONTINUED | OUTPATIENT
Start: 2024-04-05 | End: 2024-04-07 | Stop reason: HOSPADM

## 2024-04-05 RX ORDER — MISOPROSTOL 200 UG/1
800 TABLET ORAL ONCE AS NEEDED
Status: DISCONTINUED | OUTPATIENT
Start: 2024-04-05 | End: 2024-04-07 | Stop reason: HOSPADM

## 2024-04-05 RX ADMIN — ACETAMINOPHEN 975 MG: 325 TABLET ORAL at 18:20

## 2024-04-05 RX ADMIN — IBUPROFEN 600 MG: 600 TABLET, FILM COATED ORAL at 18:20

## 2024-04-05 RX ADMIN — Medication 5 ML: at 08:36

## 2024-04-05 RX ADMIN — Medication 14 ML/HR: at 08:37

## 2024-04-05 RX ADMIN — Medication 5 ML: at 08:30

## 2024-04-05 RX ADMIN — Medication 60 MILLI-UNITS/MIN: at 17:06

## 2024-04-05 RX ADMIN — ONDANSETRON 8 MG: 2 INJECTION INTRAMUSCULAR; INTRAVENOUS at 09:06

## 2024-04-05 ASSESSMENT — PAIN SCALES - GENERAL
PAINLEVEL_OUTOF10: 0 - NO PAIN
PAINLEVEL_OUTOF10: 5 - MODERATE PAIN
PAINLEVEL_OUTOF10: 10 - WORST POSSIBLE PAIN
PAINLEVEL_OUTOF10: 0 - NO PAIN
PAINLEVEL_OUTOF10: 5 - MODERATE PAIN
PAINLEVEL_OUTOF10: 0 - NO PAIN
PAINLEVEL_OUTOF10: 6
PAINLEVEL_OUTOF10: 10 - WORST POSSIBLE PAIN
PAINLEVEL_OUTOF10: 6
PAINLEVEL_OUTOF10: 0 - NO PAIN
PAINLEVEL_OUTOF10: 5 - MODERATE PAIN
PAINLEVEL_OUTOF10: 5 - MODERATE PAIN
PAINLEVEL_OUTOF10: 6
PAINLEVEL_OUTOF10: 5 - MODERATE PAIN

## 2024-04-05 ASSESSMENT — PAIN DESCRIPTION - DESCRIPTORS
DESCRIPTORS: ACHING;BURNING
DESCRIPTORS: BURNING
DESCRIPTORS: BURNING;SORE;CRAMPING

## 2024-04-05 NOTE — PROGRESS NOTES
Assessment    28 y.o.  at 39w3d  FHT Category 1  Latent labor  GBS neg    Plan      Encourage frequent position changes as tolerated  Encourage ambulation as tolerated  Maternal repositioning  Start/Continue pitocin per protocol  Pain management per patient request  Continue assessment of maternal and fetal wellbeing  Recheck as clinically indicated by maternal or fetal status  Anticipate active phase of labor  Anticipate NSVB    AROM once pt agreeable and comfortable with epidrual     Bushra Cole, APRN-CNM    Subjective:  Serena Baxter is resting in bed, getting more uncomfortable with pitocin, now at 18mu/min. She is not yet ready for epidural and does not want AROM until epidural is placed    Objective:  Fetal Monitoring      Baseline FHR: 130 per minute  Variability: moderate  Accelerations: yes  Decelerations: none  TOCO: regular, every 4-5 minutes    Cervical Exam:  def    Membrane status: intact    Pitocin is at 18 mu/min.    Vitals:    24 0335 24 0336 24 0430 24 0535   BP:  126/58 124/61 105/57   Pulse: 83 83 80 76   Resp:  20 18 18   Temp:  36.5 °C (97.7 °F) 36.4 °C (97.5 °F) 36.1 °C (97 °F)   TempSrc:  Tympanic Tympanic Tympanic   SpO2: 98% 98% 98% 99%

## 2024-04-05 NOTE — PROGRESS NOTES
Assessment    28 y.o.  at 39w3d  FHT Category 1  Latent labor  GBS neg   Asthma     Plan      Encourage frequent position changes as tolerated  Encourage ambulation as tolerated  Maternal repositioning  Start/Continue pitocin per protocol  Pain management per patient request  Continue assessment of maternal and fetal wellbeing  Recheck as clinically indicated by maternal or fetal status  Anticipate active phase of labor  Anticipate NSVB    AROM when appropriate and continue to monitor     Bushra Cole, APRN-CNM    Subjective:  Serena Baxter is resting in bed, support person at bedside. No complaints at this time Comfortable, coping well with contractions    Objective:  Fetal Monitoring      Baseline FHR: 135 per minute  Variability: moderate  Accelerations: yes  Decelerations: none  TOCO: regular, every 2-3 minutes    Cervical Exam:  def    Membrane status: intact    Pitocin is at 12 mu/min.    Vitals:    24 0240 24 0245 24 0335 24 0336   BP:    126/58   Pulse: 75 68 83 83   Resp:    20   Temp:    36.5 °C (97.7 °F)   TempSrc:    Tympanic   SpO2: 100% 100% 98% 98%

## 2024-04-05 NOTE — ANESTHESIA PROCEDURE NOTES
Epidural Block    Patient location during procedure: OB  Start time: 4/5/2024 8:08 AM  End time: 4/5/2024 8:30 AM  Reason for block: labor analgesia  Staffing  Performed: MSA   Authorized by: Rigo Trevino MD    Performed by: RENA Baez    Preanesthetic Checklist  Completed: patient identified, IV checked, risks and benefits discussed, surgical consent, pre-op evaluation, timeout performed and sterile techniques followed  Block Timeout  RN/Licensed healthcare professional reads aloud to the Anesthesia provider and entire team: Patient identity, procedure with side and site, patient position, and as applicable the availability of implants/special equipment/special requirements.  Patient on coagulant treatment: no  Timeout performed at: 4/5/2024 8:08 AM  Block Placement  Patient position: sitting  Prep: ChloraPrep  Sterility prep: cap, drape, gloves, hand and mask  Sedation level: no sedation  Patient monitoring: blood pressure, continuous pulse oximetry and heart rate  Approach: midline  Local numbing: lidocaine 1% to skin and subcutaneous tissues  Vertebral space: lumbar  Lumbar location: L3-L4  Epidural  Loss of resistance technique: saline  Guidance: landmark technique        Needle  Needle type: Tuohy   Needle gauge: 17  Needle length: 8.9cm  Needle insertion depth: 6 cm  Catheter type: multi-orifice  Catheter size: 19 G  Catheter at skin depth: 11 cm  Catheter securement method: clear occlusive dressing and liquid medical adhesive    Test dose: lidocaine 1.5% with epinephrine 1-to-200,000  Test dose: lidocaine 1.5% with epinephrine 1-to-200,000  Test dose result: no positive test dose    PCEA  Medication concentration used: 0.044% Bupivacaine with 1.25 mcg/mL Fentanyl and [id number] Epinephrine  Dose (mL): 14  Lockout (minutes): 15  1-Hour Limit (boluses/hr): 10  Basal Rate: 54        Assessment  Sensory level: T10 bilateral  Block outcome: patient comfortable  Number of attempts: 1  Events: no  positive test dose  Procedure assessment: patient tolerated procedure well with no immediate complications

## 2024-04-05 NOTE — SIGNIFICANT EVENT
S: pt comfortable    O: Cervical Exam: /-3  FHR     Baseline: 140     Variability:mod     Accels:present     Decels:cait     Category: 2  TOCO: q 2  Oxytocin:22  Membrane status: arom     Color of fluid:clear    A: IUP at  39.3 weeks      latent labor     Cat 2    GBS neg      P: Continue assessment of maternal and fetal well-being      Continue to evaluate the progress of labor      Continue pitocin per protocol      Anticipate       Attempted to break up cervical scar tissue- Dr. Lau in to attempt as well        aware of plan      SAURABH Wong-YECENIA

## 2024-04-05 NOTE — SIGNIFICANT EVENT
Pt c/o rectal pressure  Cx: 8.5/90/0    +accels mod variability early decel  Vesper- q 2-3  A/p: 27 y/o  at 39.3 weeks         Active labor         Cat 1         Gbs neg    Cont pit per protocal  Amtic svb  Ameena Perez, APRN-CNM

## 2024-04-05 NOTE — ANESTHESIA PREPROCEDURE EVALUATION
Patient: Serena Baxter    Evaluation Method: In-person visit    Procedure Information    Date: 24  Procedure: Labor Analgesia     28 y.o.  at 39w3d      Relevant Problems   Anesthesia   (+) Postoperative delirium      Pulmonary  Hospiitalized once but not intubated   (+) Asthma      Neuro   (+) Anxiety   (+) Bipolar 2 disorder, major depressive episode (CMS/HCC)   (+) Neuromuscular disease (CMS/HCC)   (+) Sciatic nerve pain      GI   (+) Esophageal hernia   (+) GI bleed      /Renal (within normal limits)      Liver (within normal limits)      Endocrine (within normal limits)      Hematology   (+) Anemia during pregnancy in third trimester      Musculoskeletal   (+) History of musculoskeletal cancer      HEENT (within normal limits)      ID   (+) PID (acute pelvic inflammatory disease)      Skin (within normal limits)      GYN   (+) Encounter for supervision of normal pregnancy, antepartum     Past Surgical History:   Procedure Laterality Date   • OTHER SURGICAL HISTORY  2022    Loop electrosurgical excision procedure     The patient has had previous epidural anesthesia (x5) without complications.    Clinical information reviewed:    Allergies  Meds  Problems              NPO Detail:  No data recorded     OB/Gyn Evaluation    Present Pregnancy    Patient is pregnant now.   Obstetric History            Physical Exam    Airway  Mallampati: III  TM distance: >3 FB  Neck ROM: full     Cardiovascular - normal exam     Dental - normal exam     Pulmonary - normal exam     Abdominal          Anesthesia Plan    History of general anesthesia?: yes  History of complications of general anesthesia?: no    ASA 2     epidural     The patient is a current smoker.    Anesthetic plan and risks discussed with patient.  Use of blood products discussed with patient who.    Plan discussed with CAA and attending.

## 2024-04-05 NOTE — PROGRESS NOTES
Intrapartum Progress Note    Assessment   Serena Baxter is a 28 y.o.  at 39w2d. FRANCISCA: 2024, by Ultrasound.   FHT Category 1  IOL   GBS neg   Asthma   Anemia     Plan   Encourage frequent position changes as tolerated  Encourage ambulation as tolerated  Maternal repositioning  Start/Continue pitocin per protocol  Pain management per patient request  Continue assessment of maternal and fetal wellbeing  Recheck as clinically indicated by maternal or fetal status  Anticipate active phase of labor  Anticipate NSVB    EMILY Posada    Subjective   PT resting in bed. Support person at bedside. RN at bedside getting ready to start pitocin. Pt feeling well with no concerns.     Pregnancy complicated by:  Serena Batxer Problems (from 09/15/23 to present)       Problem Noted Resolved    Encounter for induction of labor 2024 by EMILY Wong No    Priority:  Medium      Encounter for supervision of normal pregnancy, antepartum 3/15/2024 by Terrie Hair MD No    Priority:  Medium      Anemia during pregnancy in third trimester 2024 by EMILY Robin No    Priority:  Medium      Hyperemesis gravidarum 2021 by Jesusita Hampton No    Priority:  Medium            Principal Problem:    Encounter for induction of labor  Active Problems:    Postoperative delirium    Neuromuscular disease (CMS/HCC)    Esophageal hernia    Anxiety    History of musculoskeletal cancer    GI bleed      Objective   Last Vitals:  Temp Pulse Resp BP MAP Pulse Ox   36.4 °C (97.5 °F) 84 20 114/68   98 %     Vitals Min/Max Last 24 Hours:  Temp  Min: 36.2 °C (97.2 °F)  Max: 36.7 °C (98.1 °F)  Pulse  Min: 84  Max: 102  Resp  Min: 18  Max: 20  BP  Min: 105/68  Max: 116/71    Intake/Output:  No intake or output data in the 24 hours ending 24 8834    Physical Examination:  GENERAL: Examination reveals a well developed, well nourished, gravid female in no acute distress and whose affect is  appropriate. She is alert and cooperative.  HEENT: PERRLA. External ears normal. Nose normal, no erythema or discharge. Mouth and throat clear.  NECK: supple, no significant adenopathy  LUNGS:  normal resp effort   ABDOMEN: soft, gravid, nontender, nondistended, no abnormal masses, no epigastric pain, fundus soft, nontender, size equal dates, FHT present  FHR is  , with Accelerations, and a Category I tracing.    [place] reading:    The fetus is in a vertex presentation, determined by ultrasound  VAGINA: not evaluated  CERVIX: not evaluated; MEMBRANES are Intact  EXTREMITIES: no redness or tenderness in the calves or thighs, no edema, no limitation in range of motion  SKIN: normal coloration and turgor, no rashes  NEUROLOGICAL: alert, oriented, normal speech, no focal findings or movement disorder noted, DTRs normal and symmetrical  PSYCHOLOGICAL: awake and alert; oriented to person, place, and time    Fetal Monitoring      Baseline FHR: 145 per minute  Variability: moderate  Accelerations: yes  Decelerations: none  TOCO: irregular,    Lab Review:  Labs in chart were reviewed.

## 2024-04-05 NOTE — SIGNIFICANT EVENT
S: pt comfortable with epidural- agreeable to arom    O: Cervical Exam:3/80/-3- scar tissue present- attempted to break up  FHR     Baseline: 140     Variability:mod     Accels: present     Decels:absent     Category: I  TOCO: q 3  Oxytocin: 20  Membrane status: arom     Color of fluid: clear    A: IUP at  39.3 weeks      latent labor     Cat 1    GBS neg      P: Continue assessment of maternal and fetal well-being      Continue to evaluate the progress of labor      Continue pitocin per protocol      Anticipate         aware of plan      SAURABH Wong-ANITAM

## 2024-04-05 NOTE — PROGRESS NOTES
Assessment    28 y.o.  at 39w3d  FHT Category 1  Latent labor  GBS neg   Asthma     Plan      Encourage frequent position changes as tolerated  Encourage ambulation as tolerated  Maternal repositioning  Start/Continue pitocin per protocol  Pain management per patient request  Continue assessment of maternal and fetal wellbeing  Recheck as clinically indicated by maternal or fetal status  Anticipate active phase of labor  Anticipate NSVB    Discussed/offered AROM at this time. Pt would like have epidural prior to AROM but would like to labor a little longer first. Will plan to reassess and  continue to monitor    SAURABH Posada-YECENIA    Subjective:  Serena Baxter is resting in bed, support person at bedside. Becoming more uncomfortable with contractions but coping well. Agreeable to cervical exam at this time     Objective:  Fetal Monitoring      Baseline FHR: 130 per minute  Variability: moderate  Accelerations: yes  Decelerations: none  TOCO: regular, every 3 minutes    Cervical Exam:  3 cm dilated, 60 effaced, -3 station    Membrane status: intact    Pitocin is at 14 mu/min.    Vitals:    24 0231 24 0232 24 0240 24 0245   BP:  118/76     Pulse: 79 81 75 68   Resp:  20     Temp:  36.6 °C (97.9 °F)     TempSrc:  Tympanic     SpO2: 100% 98% 100% 100%

## 2024-04-05 NOTE — SIGNIFICANT EVENT
S: pt without complaints    O: Cervical Exam: /-  FHR     Baseline: 130     Variability: mod     Accels:present     Decels: variable/early     Category: II  TOCO: q 2-3  Oxytocin:  Membrane status     Color of fluid:    A: IUP at  39.3 weeks      active labor     Cat 1    GBS neg      P: Continue assessment of maternal and fetal well-being      Continue to evaluate the progress of labor      Continue pitocin per protocol      Anticipate         aware of plan      SAURABH Wong-YECENIA

## 2024-04-06 PROBLEM — F05 POSTOPERATIVE DELIRIUM: Status: RESOLVED | Noted: 2024-04-04 | Resolved: 2024-04-06

## 2024-04-06 PROBLEM — Z30.017 NEXPLANON INSERTION: Status: ACTIVE | Noted: 2024-04-06

## 2024-04-06 LAB
ERYTHROCYTE [DISTWIDTH] IN BLOOD BY AUTOMATED COUNT: 15.7 % (ref 11.5–14.5)
HCT VFR BLD AUTO: 29.7 % (ref 36–46)
HGB BLD-MCNC: 9.1 G/DL (ref 12–16)
MCH RBC QN AUTO: 28.9 PG (ref 26–34)
MCHC RBC AUTO-ENTMCNC: 30.6 G/DL (ref 32–36)
MCV RBC AUTO: 94 FL (ref 80–100)
NRBC BLD-RTO: 0 /100 WBCS (ref 0–0)
PLATELET # BLD AUTO: 223 X10*3/UL (ref 150–450)
RBC # BLD AUTO: 3.15 X10*6/UL (ref 4–5.2)
WBC # BLD AUTO: 15.2 X10*3/UL (ref 4.4–11.3)

## 2024-04-06 PROCEDURE — 85027 COMPLETE CBC AUTOMATED: CPT | Performed by: MIDWIFE

## 2024-04-06 PROCEDURE — 2500000001 HC RX 250 WO HCPCS SELF ADMINISTERED DRUGS (ALT 637 FOR MEDICARE OP): Performed by: MIDWIFE

## 2024-04-06 PROCEDURE — 2500000001 HC RX 250 WO HCPCS SELF ADMINISTERED DRUGS (ALT 637 FOR MEDICARE OP)

## 2024-04-06 PROCEDURE — 36415 COLL VENOUS BLD VENIPUNCTURE: CPT | Performed by: MIDWIFE

## 2024-04-06 PROCEDURE — 2500000001 HC RX 250 WO HCPCS SELF ADMINISTERED DRUGS (ALT 637 FOR MEDICARE OP): Performed by: NURSE PRACTITIONER

## 2024-04-06 PROCEDURE — 1210000001 HC SEMI-PRIVATE ROOM DAILY

## 2024-04-06 PROCEDURE — 2500000005 HC RX 250 GENERAL PHARMACY W/O HCPCS: Performed by: MIDWIFE

## 2024-04-06 PROCEDURE — 2500000004 HC RX 250 GENERAL PHARMACY W/ HCPCS (ALT 636 FOR OP/ED): Performed by: MIDWIFE

## 2024-04-06 RX ORDER — DIPHENHYDRAMINE HCL 25 MG
25 CAPSULE ORAL ONCE
Status: COMPLETED | OUTPATIENT
Start: 2024-04-06 | End: 2024-04-06

## 2024-04-06 RX ORDER — LIDOCAINE HYDROCHLORIDE 10 MG/ML
3 INJECTION INFILTRATION; PERINEURAL ONCE AS NEEDED
Status: DISCONTINUED | OUTPATIENT
Start: 2024-04-06 | End: 2024-04-07 | Stop reason: HOSPADM

## 2024-04-06 RX ORDER — CYCLOBENZAPRINE HCL 10 MG
10 TABLET ORAL 3 TIMES DAILY PRN
Status: DISCONTINUED | OUTPATIENT
Start: 2024-04-06 | End: 2024-04-07 | Stop reason: HOSPADM

## 2024-04-06 RX ORDER — OXYCODONE HYDROCHLORIDE 5 MG/1
5 TABLET ORAL ONCE
Status: COMPLETED | OUTPATIENT
Start: 2024-04-06 | End: 2024-04-06

## 2024-04-06 RX ADMIN — DIPHENHYDRAMINE HYDROCHLORIDE 25 MG: 25 CAPSULE ORAL at 21:20

## 2024-04-06 RX ADMIN — ACETAMINOPHEN 975 MG: 325 TABLET ORAL at 13:12

## 2024-04-06 RX ADMIN — BENZOCAINE AND MENTHOL 1 LOZENGE: 15; 3.6 LOZENGE ORAL at 18:55

## 2024-04-06 RX ADMIN — ACETAMINOPHEN 975 MG: 325 TABLET ORAL at 06:58

## 2024-04-06 RX ADMIN — BENZOCAINE AND MENTHOL 1 LOZENGE: 15; 3.6 LOZENGE ORAL at 21:20

## 2024-04-06 RX ADMIN — ENOXAPARIN SODIUM 40 MG: 100 INJECTION SUBCUTANEOUS at 07:01

## 2024-04-06 RX ADMIN — BENZOCAINE AND LEVOMENTHOL 1 APPLICATION: 200; 5 SPRAY TOPICAL at 00:52

## 2024-04-06 RX ADMIN — IBUPROFEN 600 MG: 600 TABLET, FILM COATED ORAL at 06:58

## 2024-04-06 RX ADMIN — IBUPROFEN 600 MG: 600 TABLET, FILM COATED ORAL at 00:50

## 2024-04-06 RX ADMIN — ACETAMINOPHEN 975 MG: 325 TABLET ORAL at 00:50

## 2024-04-06 RX ADMIN — ACETAMINOPHEN 975 MG: 325 TABLET ORAL at 18:51

## 2024-04-06 RX ADMIN — CYCLOBENZAPRINE 10 MG: 10 TABLET, FILM COATED ORAL at 21:20

## 2024-04-06 RX ADMIN — IBUPROFEN 600 MG: 600 TABLET, FILM COATED ORAL at 18:52

## 2024-04-06 RX ADMIN — IBUPROFEN 600 MG: 600 TABLET, FILM COATED ORAL at 13:12

## 2024-04-06 RX ADMIN — WITCH HAZEL 1 EACH: 5 CLOTH TOPICAL at 00:00

## 2024-04-06 RX ADMIN — OXYCODONE HYDROCHLORIDE 5 MG: 5 TABLET ORAL at 10:21

## 2024-04-06 ASSESSMENT — PAIN - FUNCTIONAL ASSESSMENT
PAIN_FUNCTIONAL_ASSESSMENT: 0-10

## 2024-04-06 ASSESSMENT — PAIN SCALES - GENERAL
PAINLEVEL_OUTOF10: 5 - MODERATE PAIN
PAINLEVEL_OUTOF10: 5 - MODERATE PAIN
PAINLEVEL_OUTOF10: 10 - WORST POSSIBLE PAIN
PAINLEVEL_OUTOF10: 10 - WORST POSSIBLE PAIN
PAINLEVEL_OUTOF10: 7
PAINLEVEL_OUTOF10: 10 - WORST POSSIBLE PAIN
PAIN_LEVEL: 2
PAINLEVEL_OUTOF10: 10 - WORST POSSIBLE PAIN

## 2024-04-06 ASSESSMENT — PAIN DESCRIPTION - LOCATION
LOCATION: ABDOMEN

## 2024-04-06 ASSESSMENT — PAIN DESCRIPTION - DESCRIPTORS
DESCRIPTORS: BURNING;SORE
DESCRIPTORS: ACHING;CRAMPING

## 2024-04-06 NOTE — LACTATION NOTE
actation Consultant Note  Lactation Consultation  Reason for Consult: Initial assessment  Consultant Name: JUAN MIGUEL Soto    Maternal Information  Has mother  before?: Yes  How long did the mother previously breastfeed?: 5 other children, ages 11 years to 2.5 years (including twins) for 4-6 weeks  Previous Maternal Breastfeeding Challenges: None  Exclusive Pump and Bottle Feed: No    Maternal Assessment  Breast Assessment: Medium, Symmetrical, Soft, Compressible  Nipple Assessment: Intact, Erect  Areola Assessment: Normal    Infant Assessment  Infant Behavior: Other (Comment) (infant not in room at time of visit)    Feeding Assessment  Nutrition Source: Breastmilk, Formula (per mother’s request)  Feeding Method: Nursing at the breast, Paced bottle    LATCH TOOL       Breast Pump       Other OB Lactation Tools       Patient Follow-up  Inpatient Lactation Follow-up Needed : Yes    Other OB Lactation Documentation  Maternal Risk Factors: Preeclampsia  Infant Risk Factors: Early term birth 37-39 weeks, Prelacteal feeds    Recommendations/Summary    The infant was not in the room during the time of my visit. He was in the nursery for a circumcision. This mother reports experience nursing 5 other children (ages 11 years to 2.5 years) for 4-6 weeks. With her other children, she did breast and formula from the beginning and states that this is her intention with her . She denies any difficulty with latching or pain/discomfort while breastfeeding. I reviewed the following breastfeeding topics: early milk production; the benefits of skin to skin; frequent feeds with goal of 8-12 feeds/24 hours; the importance of a deep latch; and alternating starting breast and allowing the infant to end the feeding. The mother was given written information on outpatient lactation services. She does not have a breast pump for home use. A order was placed to [facility] for a hands-free breast pump The mother was  offered assistance as needed for breastfeeding. She denies any questions at this time.

## 2024-04-06 NOTE — DISCHARGE INSTRUCTIONS

## 2024-04-06 NOTE — PROGRESS NOTES
Postpartum Progress Note      Assessment/Plan     Serena Baxter is a 28 y.o.,  initially presented for rrIOL  She had a Vaginal, Spontaneous   delivery on 2024  at 39w3d and is now postpartum day 1.    #ABLA  - Hg 11.3 -> 9.1  - HA relieved with motrin/tylenol, otherwise asx  - For PO Fe at DC    #Postpartum  - continue routine postpartum care  - pain well controlled on po medications  - DVT Score: 5 ; SCDs and enoxaparin prophylactic dose daily while inpatient  - The patient's blood type is A POS. Rhogam is not indicated.    #Maternal Well-Being  - emotional support provided  - bonding with infant  - Contraception: Nexplanon prior to DC    #[place] Feeding  - breastfeeding/pumping encouraged; lactation consult prn    #Dispo  - anticipate d/c on PPD #2 if continuing to meet all postpartum milestones  - for f/u 4-6 weeks with Primary OB provider      Principal Problem:     (normal spontaneous vaginal delivery)  Active Problems:    Anxiety    Nexplanon insertion     Subjective   Pt seen sitting up in chair. Pain is contractions and intense. Accepting of 1x dose oxycodone.  Endorses HA off and on. Relief with resting and motrin/tylenol.     Meeting all postpartum milestones- ambulating independently, passing flatus, tolerating PO intake, lochia light, voiding spontaneously, and pain well controlled with PO meds.    Objective   Physical Exam:  General: well appearing, well nourished, postpartum  Obstetric: fundus firm below umbilicus, lochia light  Skin: Warm, dry; no rashes/lesions/erythema  Breast: No masses, nipple discharge  Neuro: A/Ox3, no gross motor deficit   GI: no distension, appropriately tender, soft, +BS  Respiratory: Even and unlabored on RA, LSCTA BL  Cardiovascular: Trace BLE edema; No erythema, warmth  Psych: appropriate mood and affect, conversational    Last Vitals:  Temp Pulse Resp BP MAP Pulse Ox   36.5 °C (97.7 °F) 76 16 102/63   95 %       Vitals Min/Max Last 24 Hours:  Temp  Min:  36 °C (96.8 °F)  Max: 37 °C (98.6 °F)  Pulse  Min: 66  Max: 109  Resp  Min: 16  Max: 18  BP  Min: 98/59  Max: 125/76    Lab Data:  Lab Results   Component Value Date    WBC 15.2 (H) 04/06/2024    HGB 9.1 (L) 04/06/2024    HCT 29.7 (L) 04/06/2024     04/06/2024

## 2024-04-06 NOTE — ANESTHESIA POSTPROCEDURE EVALUATION
Patient: Serena Baxter    Procedure Summary       Date: 24 Room / Location:     Anesthesia Start: 0800 Anesthesia Stop: 1703    Procedure: Labor Analgesia Diagnosis:     Scheduled Providers:  Responsible Provider: Rigo Trevino MD    Anesthesia Type: epidural ASA Status: 2            Anesthesia Type: epidural        Anesthesia Post Evaluation    Patient location during evaluation: floor  Patient participation: complete - patient participated  Level of consciousness: awake  Pain score: 2  Pain management: adequate  Multimodal analgesia pain management approach  Airway patency: patent  Two or more strategies used to mitigate risk of obstructive sleep apnea  Cardiovascular status: acceptable  Respiratory status: acceptable  Hydration status: acceptable  Postoperative Nausea and Vomiting: none  Comments: Serena Baxter is a 28 y.o., , who had a Vaginal, Spontaneous delivery on 2024 at 39w3d and is now POD1.    She had Neuraxial Anesthesia without immediate complications noted.       Pain well controlled    ---------------------------               24                      0435         ---------------------------   BP:           98/59         Pulse:         70           Resp:          16           Temp:   36.6 °C (97.9 °F)   SpO2:          98%         ---------------------------    Neuraxial site assessed. No visible redness or swelling or drainage. Patient able to ambulate and move all extremities without difficulty. Able to void. No complaints of nausea/vomiting. Tolerating PO intake well. No s/sx of PDPH.     Anesthesia will sign off     Everton Rodriguez, DO          No notable events documented.

## 2024-04-07 VITALS
SYSTOLIC BLOOD PRESSURE: 133 MMHG | OXYGEN SATURATION: 96 % | RESPIRATION RATE: 16 BRPM | HEIGHT: 64 IN | DIASTOLIC BLOOD PRESSURE: 77 MMHG | HEART RATE: 61 BPM | WEIGHT: 223.11 LBS | BODY MASS INDEX: 38.09 KG/M2 | TEMPERATURE: 97.2 F

## 2024-04-07 PROCEDURE — 90707 MMR VACCINE SC: CPT | Performed by: MIDWIFE

## 2024-04-07 PROCEDURE — 90471 IMMUNIZATION ADMIN: CPT | Performed by: MIDWIFE

## 2024-04-07 PROCEDURE — 11981 INSERTION DRUG DLVR IMPLANT: CPT | Performed by: NURSE PRACTITIONER

## 2024-04-07 PROCEDURE — 2500000004 HC RX 250 GENERAL PHARMACY W/ HCPCS (ALT 636 FOR OP/ED): Performed by: MIDWIFE

## 2024-04-07 PROCEDURE — 2500000001 HC RX 250 WO HCPCS SELF ADMINISTERED DRUGS (ALT 637 FOR MEDICARE OP): Performed by: NURSE PRACTITIONER

## 2024-04-07 PROCEDURE — 2500000001 HC RX 250 WO HCPCS SELF ADMINISTERED DRUGS (ALT 637 FOR MEDICARE OP)

## 2024-04-07 PROCEDURE — 2500000001 HC RX 250 WO HCPCS SELF ADMINISTERED DRUGS (ALT 637 FOR MEDICARE OP): Performed by: MIDWIFE

## 2024-04-07 RX ORDER — OXYCODONE HYDROCHLORIDE 5 MG/1
5 TABLET ORAL ONCE
Status: COMPLETED | OUTPATIENT
Start: 2024-04-07 | End: 2024-04-07

## 2024-04-07 RX ORDER — DOCUSATE SODIUM 100 MG/1
100 CAPSULE, LIQUID FILLED ORAL 2 TIMES DAILY PRN
Qty: 60 CAPSULE | Refills: 0 | Status: SHIPPED | OUTPATIENT
Start: 2024-04-07 | End: 2024-05-07

## 2024-04-07 RX ORDER — METOCLOPRAMIDE 10 MG/1
10 TABLET ORAL ONCE
Status: COMPLETED | OUTPATIENT
Start: 2024-04-07 | End: 2024-04-07

## 2024-04-07 RX ORDER — IBUPROFEN 600 MG/1
600 TABLET ORAL EVERY 6 HOURS PRN
Qty: 120 TABLET | Refills: 0 | Status: SHIPPED | OUTPATIENT
Start: 2024-04-07 | End: 2024-05-07

## 2024-04-07 RX ORDER — ACETAMINOPHEN 500 MG
1000 TABLET ORAL EVERY 6 HOURS PRN
Qty: 120 TABLET | Refills: 0 | Status: SHIPPED | OUTPATIENT
Start: 2024-04-07

## 2024-04-07 RX ORDER — OXYCODONE HYDROCHLORIDE 5 MG/1
5 TABLET ORAL EVERY 6 HOURS PRN
Qty: 5 TABLET | Refills: 0 | Status: SHIPPED | OUTPATIENT
Start: 2024-04-07

## 2024-04-07 RX ADMIN — ACETAMINOPHEN 975 MG: 325 TABLET ORAL at 06:59

## 2024-04-07 RX ADMIN — METOCLOPRAMIDE 10 MG: 10 TABLET ORAL at 01:23

## 2024-04-07 RX ADMIN — IBUPROFEN 600 MG: 600 TABLET, FILM COATED ORAL at 06:58

## 2024-04-07 RX ADMIN — ACETAMINOPHEN 975 MG: 325 TABLET ORAL at 12:28

## 2024-04-07 RX ADMIN — IBUPROFEN 600 MG: 600 TABLET, FILM COATED ORAL at 01:21

## 2024-04-07 RX ADMIN — ACETAMINOPHEN 975 MG: 325 TABLET ORAL at 01:23

## 2024-04-07 RX ADMIN — IBUPROFEN 600 MG: 600 TABLET, FILM COATED ORAL at 12:28

## 2024-04-07 RX ADMIN — OXYCODONE HYDROCHLORIDE 5 MG: 5 TABLET ORAL at 10:52

## 2024-04-07 RX ADMIN — MEASLES, MUMPS, AND RUBELLA VIRUS VACCINE LIVE 0.5 ML: 1000; 12500; 1000 INJECTION, POWDER, LYOPHILIZED, FOR SUSPENSION SUBCUTANEOUS at 11:41

## 2024-04-07 RX ADMIN — BENZOCAINE AND MENTHOL 1 LOZENGE: 15; 3.6 LOZENGE ORAL at 01:23

## 2024-04-07 RX ADMIN — ENOXAPARIN SODIUM 40 MG: 100 INJECTION SUBCUTANEOUS at 06:51

## 2024-04-07 ASSESSMENT — PAIN SCALES - GENERAL
PAINLEVEL_OUTOF10: 8
PAINLEVEL_OUTOF10: 6
PAINLEVEL_OUTOF10: 6
PAINLEVEL_OUTOF10: 3
PAINLEVEL_OUTOF10: 4
PAINLEVEL_OUTOF10: 3

## 2024-04-07 ASSESSMENT — PAIN DESCRIPTION - LOCATION
LOCATION: ABDOMEN
LOCATION: ABDOMEN
LOCATION: HEAD
LOCATION: ABDOMEN

## 2024-04-07 ASSESSMENT — PAIN DESCRIPTION - DESCRIPTORS
DESCRIPTORS: CRAMPING
DESCRIPTORS: ACHING;CRAMPING
DESCRIPTORS: CRAMPING

## 2024-04-07 ASSESSMENT — PAIN - FUNCTIONAL ASSESSMENT
PAIN_FUNCTIONAL_ASSESSMENT: 0-10

## 2024-04-07 NOTE — CARE PLAN
Problem: Pain - Adult  Goal: Verbalizes/displays adequate comfort level or baseline comfort level  Outcome: Met     Problem: Safety - Adult  Goal: Free from fall injury  Outcome: Met     Problem: Postpartum  Goal: Experiences normal postpartum course  Outcome: Met   Stable for discharge. AS
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
## 2024-04-07 NOTE — PROCEDURES
"Nexplanon Insertion    Date/Time: 4/7/2024 3:00 PM    Performed by: KATIE Ponce  Authorized by: KATIE Ponce    Consent:     Consent obtained:  Verbal and written    Consent given by:  Patient    Risks, benefits, and alternatives were discussed: yes      Risks discussed:  Bleeding, infection and pain    Alternatives discussed:  No treatment, delayed treatment, alternative treatment, observation and referral  Universal protocol:     Procedure explained and questions answered to patient or proxy's satisfaction: yes      Relevant documents present and verified: yes      Required blood products, implants, devices, and special equipment available: yes      Site/side marked: yes      Immediately prior to procedure, a time out was called: yes      Patient identity confirmed:  Verbally with patient and arm band  Indications:     Indications:  Request for subdermal LARC  Pre-procedure details:     Skin preparation:  Povidone-iodine  Sedation:     Sedation type:  None  Anesthesia:     Anesthesia method:  Local infiltration    Local anesthetic:  Lidocaine 1% w/o epi  Procedure specific details:      Nexplanon placement      Risks, benefits and alternatives were discussed with the patient. We discussed possible complications and risks, including infection, bleeding, pain, tingling, failure, migration of implant, increased risk of ectopic should pregnancy occur and inability to remove implant. Electronic consent was obtained prior to the procedure and is detailed in the patient's record.     Procedure Note: Patient placed in semi-fowlers position. 3 ml of 1% lidocaine was injected just under the skin at marked insertion site.  The skin was then prepped with betadine.   RN to bedside to assist with positioning of arm given large body habitus. Nexplanon applicator inserted to hub and retractor withdrawn.   Nexplanon expelled through skin approximately 1.5\" from insertion site. Pressure applied to insertion and " exit site, no active bleeding. Implant reviewed and intact.     Discussed plan with the patient and she declines reinsertion attempt.    SAURABH Ponce-LICHA     Post-procedure details:     Procedure completion:  Procedure terminated electively by provider

## 2024-04-07 NOTE — LACTATION NOTE
Lactation Consultant Note  Lactation Consultation  Reason for Consult: Follow-up assessment  Consultant Name: Dayana Duong RN, IBCLC    Maternal Information       Maternal Assessment  Breast Assessment: Medium  Nipple Assessment: Intact  Areola Assessment: Normal    Infant Assessment  Infant Behavior: Deep sleep    Feeding Assessment       LATCH TOOL       Breast Pump       Other OB Lactation Tools       Patient Follow-up  Lactation Professional - OK to Discharge: Yes    Other OB Lactation Documentation       Recommendations/Summary  Stepped into mom's room to see if mom had any further questions regarding her feeding plan. Mom stated that she plans to pump and formula feed. Mom stated she is pumping every 3 hours. Gave mom CDC guidelines for cleaning and washing pump parts, and safe storage of breastmilk (PI #[id number]). Measured mom's nipples for flange sizing. Outpatient resources given to mom.

## 2024-04-07 NOTE — DISCHARGE SUMMARY
Discharge Summary    Admission Date: 2024  Discharge Date: 24  Discharge Diagnosis:  (normal spontaneous vaginal delivery)     Patient Active Problem List   Diagnosis    Thoracic spine pain    Suicidal ideation    Rupture of anterior cruciate ligament of left knee    Obesity, Class II, BMI 35-39.9    Nicotine use disorder    Marijuana use    Hyperemesis gravidarum    HSIL (high grade squamous intraepithelial lesion) on Pap smear of cervix    Hematuria    Depressed mood with postpartum onset    JENNIFER III with severe dysplasia    Bipolar 2 disorder, major depressive episode (CMS/HCC)    Asthma    Atypical squamous cells of undetermined significance (ASCUS) on Papanicolaou smear of cervix    PID (acute pelvic inflammatory disease)    Sciatic nerve pain    Anemia during pregnancy in third trimester    Encounter for supervision of normal pregnancy, antepartum     (normal spontaneous vaginal delivery)    Neuromuscular disease (CMS/HCC)    Esophageal hernia    Anxiety    History of musculoskeletal cancer    GI bleed    Nexplanon insertion       Hospital Course  Serena Baxter is a 28 y.o.,     Initially presented for: rrIOL    Admission Date: 2024    Delivery Date: 2024  5:03 PM     Delivery type: Vaginal, Spontaneous      GA at delivery: 39w3d    Outcome: Living     Anesthesia during delivery: Epidural     Intrapartum complications: None     Feeding method: Breastfeeding Status: Yes    Contraception: Nexplanon unable to be placed; for interval insertion    Rhogam: The patient's blood type is A POS. The baby's blood type is O POS . Rhogam is not indicated.     Now postpartum day: 2.    Hospital course n/f:  #ABLA  - Hg 11.3 -> 9.1  - HA relieved with motrin/tylenol, otherwise asx  - For PO Fe at DC    PP course otherwise uneventful.  Meeting all postpartum milestones- ambulating independently, passing flatus, tolerating PO intake, lochia light, voiding spontaneously, and pain well controlled  with PO meds.     Dispo  OK for DC today  6 week postpartum follow-up with prenatal provider.     Pertinent Physical Exam At Time of Discharge  General: well appearing, obese, postpartum  Obstetric: fundus firm below umbilicus, lochia light  Skin: Warm, dry; no rashes/lesions/erythema  Breast: No masses, nipple discharge  Neuro: A/Ox3, conversational, no gross motor deficit   GI: no distension, appropriately tender, soft, +BS  Respiratory: Even and unlabored on RA, LSCTA BL  Cardiovascular: No BLE edema; No erythema, warmth  Psych: appropriate mood and affect       Your medication list        START taking these medications        Instructions Last Dose Given Next Dose Due   acetaminophen 500 mg tablet  Commonly known as: Tylenol      Take 2 tablets (1,000 mg) by mouth every 6 hours if needed for moderate pain (4 - 6).       docusate sodium 100 mg capsule  Commonly known as: Colace      Take 1 capsule (100 mg) by mouth 2 times a day as needed for constipation.       ibuprofen 600 mg tablet      Take 1 tablet (600 mg) by mouth every 6 hours if needed for moderate pain (4 - 6) (pain).       oxyCODONE 5 mg immediate release tablet  Commonly known as: Roxicodone      Take 1 tablet (5 mg) by mouth every 6 hours if needed (severe cramping pain with breastfeeding).              CONTINUE taking these medications        Instructions Last Dose Given Next Dose Due   albuterol 90 mcg/actuation inhaler           Classic Prenatal 28 mg iron-800 mcg folic acid tablet tablet  Generic drug: prenatal vitamin           loratadine 10 mg disintegrating tablet  Commonly known as: Claritin Reditabs      Take 1 tablet (10 mg) by mouth once daily.       Vitamin B-6 25 mg tablet  Generic drug: pyridoxine                  STOP taking these medications      aspirin 81 mg EC tablet        diphenhydrAMINE 25 mg capsule  Commonly known as: BENADryl        doxylamine 25 mg tablet  Commonly known as: Unisom (doxylamine)                  Where to Get  Your Medications        These medications were sent to [id number] - [place], [place] - [address]  [address] [address] [facility] [address]      Phone: [phone]   acetaminophen 500 mg tablet  docusate sodium 100 mg capsule  ibuprofen 600 mg tablet  oxyCODONE 5 mg immediate release tablet           Outpatient Follow-Up  No future appointments.    Yasemin Gabriel, SAURABH-CNP

## 2024-05-24 ENCOUNTER — APPOINTMENT (OUTPATIENT)
Dept: OBSTETRICS AND GYNECOLOGY | Facility: HOSPITAL | Age: 29
End: 2024-05-24
Payer: COMMERCIAL